# Patient Record
Sex: MALE | Race: WHITE | NOT HISPANIC OR LATINO | Employment: FULL TIME | URBAN - METROPOLITAN AREA
[De-identification: names, ages, dates, MRNs, and addresses within clinical notes are randomized per-mention and may not be internally consistent; named-entity substitution may affect disease eponyms.]

---

## 2017-07-21 ENCOUNTER — MEDICAL CORRESPONDENCE (OUTPATIENT)
Dept: HEALTH INFORMATION MANAGEMENT | Facility: CLINIC | Age: 42
End: 2017-07-21

## 2017-08-23 ENCOUNTER — OFFICE VISIT (OUTPATIENT)
Dept: OPTOMETRY | Facility: CLINIC | Age: 42
End: 2017-08-23

## 2017-08-23 DIAGNOSIS — H52.13 MYOPIA, BILATERAL: Primary | ICD-10-CM

## 2017-08-23 NOTE — MR AVS SNAPSHOT
After Visit Summary   8/23/2017    Rusty Serrato    MRN: [id number]           Patient Information     Date Of Birth          1975        Visit Information        Provider Department      8/23/2017 8:00 AM Clare Stephen, [facility]        Today's Diagnoses     Myopia, bilateral    -  1       Follow-ups after your visit        Your next 10 appointments already scheduled     Aug 30, 2017 12:00 PM CDT   (Arrive by 11:45 AM)   Return Multiple Sclerosis with Roel Chin MD   [facility] Multiple Sclerosis ([facility] [place])    [address] [place] [address] [phone]              Who to contact     Please call your clinic at [phone] to:    Ask questions about your health    Make or cancel appointments    Discuss your medicines    Learn about your test results    Speak to your doctor   If you have compliments or concerns about an experience at your clinic, or if you wish to file a complaint, please contact [facility] Patient Relations at [phone] or email us at [email or web address]         Additional Information About Your Visit        MyChart Information     [employer] gives you secure access to your electronic health record. If you see a primary care provider, you can also send messages to your care team and make appointments. If you have questions, please call your primary care clinic.  If you do not have a primary care provider, please call [phone] and they will assist you.      [employer]AYAK is an electronic gateway that provides easy, online access to your medical records. With KAY[employer], you can request a clinic appointment, read your test results, renew a prescription or communicate with your care team.     To access your existing account, please contact your [facility] or call [phone] for assistance.        Care EveryWhere ID     This is your Care  EveryWhere ID. This could be used by other organizations to access your [place] medical records  [id number]         Blood Pressure from Last 3 Encounters:   05/07/15 120/77   10/16/14 116/80   08/07/14 122/76    Weight from Last 3 Encounters:   05/07/15 84.8 kg (187 lb)   10/16/14 83.6 kg (184 lb 4.8 oz)   08/07/14 85.1 kg (187 lb 9.6 oz)              Today, you had the following     No orders found for display       Primary Care Provider Office Phone # Fax #    Simone Bustillo [phone]       [facility] [address] [facility] [address]        Equal Access to Services     MARIANA HAJI : Hadii medardo vasquezo Soliya, waaxda luqadaha, qaybta kaalmada adeegyada, waxjeannie fay. So [facility] [phone].    ATENCIÓN: Si habla español, tiene a diallo disposición servicios gratuitos de asistencia lingüística. Kristophermiladis al [phone].    We comply with applicable federal civil rights laws and [place] laws. We do not discriminate on the basis of race, color, national origin, age, disability sex, sexual orientation or gender identity.            Thank you!     Thank you for choosing EYE CLINIC  for your care. Our goal is always to provide you with excellent care. Hearing back from our patients is one way we can continue to improve our services. Please take a few minutes to complete the written survey that you may receive in the mail after your visit with us. Thank you!             Your Updated Medication List - Protect others around you: Learn how to safely use, store and throw away your medicines at www.disposemymeds.org.          This list is accurate as of: 8/23/17 11:59 PM.  Always use your most recent med list.                   Brand Name Dispense Instructions for use Diagnosis    AMINO ACID PO      Take by mouth daily        calcium citrate 250 MG Tabs      Take 2 tablets by mouth daily        cholecalciferol 5000 UNITS Tabs tablet    vitamin D3      Take by mouth daily        magnesium 250 MG tablet      Take 2 tablets by mouth daily        MULTIVITAMIN PO      Take by mouth daily        OMEGA-3 FISH OIL PO      Take by mouth daily

## 2017-08-24 NOTE — PROGRESS NOTES
No office visit. CL order only.    Contact Lens Billing  V-Code:  - Soft spherical  Final Contact Lens Rx      Brand Base Curve Diameter Sphere   Right Acuvue 1 Day Moist 8.5 14.2 -4.50   Left Acuvue 1 Day Moist 8.5 14.2 -4.50            # of units: 2 90-packs  Price per Unit: $68 per 90-pack    These are for cosmetic contact lenses.    Encounter Diagnosis   Name Primary?     Myopia, bilateral Yes        Date of last eye exam: 9/21/16

## 2017-08-29 DIAGNOSIS — G35 MS (MULTIPLE SCLEROSIS) (H): Primary | ICD-10-CM

## 2017-08-29 NOTE — PROGRESS NOTES
"This note is created from a chart review. He was seen by Dr. Page from 7/10/14 til 5/14/2015     The patient first  presented to neurological care because of a Bell's Palsy at age 20. Around the same time he also developed some pain in the fingertips of his left hand and the perioral region on the left. He had an MRI of the brain which he was told that it showed a lesion. He was told it could be MS and he should follow up in 5 years. He overall did well until 2010. At that time, he noticed changes in his speech and gait, that caused him to be fired from a job because they thought he was coming in \"hungover or drunk.\" The reportedly had gotten slowly worse over the ensuing 4 years. He got an MRI of the brain performed which revealed multiple areas of increased signal in T2 and FLAIR-weighted sequences within the subcortical white matter of bilateral hemispheres without posterior fossa involvement.  There were no enhancing lesions, a lot of lesions at the gray white junction and some volume loss was noted as well.  Due to the results of this MRI, he was thought to have MS.  He received some steroids which did help some with his energy.      From 2010 to 2014, hte patient had seen several doctors. Dr. Lei noted that the patient had some optic disc pallor. He also was seen by Dr. Botello, who   who diagnosed the patient withs relapsing remitting secondary progressive multiple sclerosis. The patient was started on Copaxone in May of 2014  "

## 2018-01-03 ENCOUNTER — TELEPHONE (OUTPATIENT)
Dept: UROLOGY | Facility: CLINIC | Age: 43
End: 2018-01-03

## 2018-01-03 NOTE — TELEPHONE ENCOUNTER
Received a message on nurse line in regards to a audit request. Patient has not been seen recently. The phone number that was given to me was a 1-800 number with a non-existing extension. Will wait for return phone call. Lorena Javier LPN

## 2018-02-21 DIAGNOSIS — H53.10 SUBJECTIVE VISUAL DISTURBANCE: Primary | ICD-10-CM

## 2018-02-22 ENCOUNTER — OFFICE VISIT (OUTPATIENT)
Dept: OPHTHALMOLOGY | Facility: CLINIC | Age: 43
End: 2018-02-22
Attending: OPHTHALMOLOGY
Payer: MEDICARE

## 2018-02-22 DIAGNOSIS — H53.10 SUBJECTIVE VISUAL DISTURBANCE: ICD-10-CM

## 2018-02-22 DIAGNOSIS — H47.20 OPTIC ATROPHY: Primary | ICD-10-CM

## 2018-02-22 PROCEDURE — G0463 HOSPITAL OUTPT CLINIC VISIT: HCPCS | Mod: 25,ZF | Performed by: TECHNICIAN/TECHNOLOGIST

## 2018-02-22 PROCEDURE — 92015 DETERMINE REFRACTIVE STATE: CPT | Mod: ZF | Performed by: TECHNICIAN/TECHNOLOGIST

## 2018-02-22 PROCEDURE — 92083 EXTENDED VISUAL FIELD XM: CPT | Mod: ZF | Performed by: OPHTHALMOLOGY

## 2018-02-22 PROCEDURE — 92133 CPTRZD OPH DX IMG PST SGM ON: CPT | Mod: ZF | Performed by: OPHTHALMOLOGY

## 2018-02-22 ASSESSMENT — CONF VISUAL FIELD
OD_NORMAL: 1
OS_NORMAL: 1
METHOD: COUNTING FINGERS

## 2018-02-22 ASSESSMENT — REFRACTION_MANIFEST
OD_SPHERE: -6.00
OD_CYLINDER: +0.75
OS_CYLINDER: +0.25
OD_AXIS: 035
OS_AXIS: 110
OS_SPHERE: -5.25

## 2018-02-22 ASSESSMENT — VISUAL ACUITY
OS_PH_CC: 20/20
OD_CC: 20/25
METHOD: SNELLEN - LINEAR
OD_CC+: -2
CORRECTION_TYPE: GLASSES
OS_CC+: -2
OS_CC: 20/20

## 2018-02-22 ASSESSMENT — REFRACTION_WEARINGRX
OS_AXIS: 100
OD_AXIS: 070
OD_SPHERE: -5.25
SPECS_TYPE: SVL
OS_CYLINDER: +0.50
OS_SPHERE: -4.75
OD_CYLINDER: +0.75

## 2018-02-22 ASSESSMENT — TONOMETRY
IOP_METHOD: ICARE
OS_IOP_MMHG: 11
OD_IOP_MMHG: 11

## 2018-02-22 NOTE — PROGRESS NOTES
Assessment & Plan     Rusty Serrato is a 42 year old male with the following diagnoses:   1. Optic atrophy    2. Subjective visual disturbance       Follow-up optic atrophy.  He has a history of primary progressive multiple sclerosis.  His last visit was in August 2016.  He denies any new changes with his vision.  On exam his visual acuity is 2025 right eye 20/20 left eye.  His visual fields show some subtle changes in the left eye, which is worse than his last visit a year ago.  However his nerve fiber layer is stable arguing that the visual field defect is artifact.    Overall I believe that his visual function is stable.  He does not have evidence of nystagmus or an JAIDEN.  I would recommend follow-up in a year or 2 for repeat evaluation.         Attending Physician Attestation:  Complete documentation of historical and exam elements from today's encounter can be found in the full encounter summary report (not reduplicated in this progress note).  I personally obtained the chief complaint(s) and history of present illness.  I confirmed and edited as necessary the review of systems, past medical/surgical history, family history, social history, and examination findings as documented by others; and I examined the patient myself.  I personally reviewed the relevant tests, images, and reports as documented above.  I formulated and edited as necessary the assessment and plan and discussed the findings and management plan with the patient and family. - Bear Zurita MD

## 2018-02-22 NOTE — LETTER
2018         RE:  :  MRN: Rusty QUEEN May  1975  [id number]     Dear Dr. Bustillo:    Your patient, Rusty QUEEN May, returned for neuro-ophthalmic follow up. My assessment and plan are below.  For further details, please see my attached clinic note.      Assessment & Plan     Rusty Serrato is a 42 year old male with the following diagnoses:   1. Optic atrophy    2. Subjective visual disturbance       Follow-up optic atrophy.  He has a history of primary progressive multiple sclerosis.  His last visit was in 2016.  He denies any new changes with his vision.  On exam his visual acuity is 5 right eye 20/20 left eye.  His visual fields show some subtle changes in the left eye, which is worse than his last visit a year ago.  However his nerve fiber layer is stable arguing that the visual field defect is artifact.    Overall I believe that his visual function is stable.  He does not have evidence of nystagmus or an JAIDEN.  I would recommend follow-up in a year or 2 for repeat evaluation.    Again, thank you for allowing me to participate in the care of your patient.      Sincerely,    Bear Zurita MD  Professor, Neuro-Ophthalmology  Department of Ophthalmology and Visual Neurosciences  [facility]    CC: Mo Chávez MD  [facility]

## 2018-02-22 NOTE — NURSING NOTE
Chief Complaints and History of Present Illnesses   Patient presents with     Follow Up For     optic atrophy     HPI    Symptoms:              Comments:  Rusty Serrato is a 42 year old male, following up for:    1. Optic atrophy   2. Subjective visual disturbance   3. Multiple sclerosis (H)     No new changes in vision.   No new changes health.   ALPHONSE Echavarria 2/22/2018 10:08 AM

## 2018-02-22 NOTE — MR AVS SNAPSHOT
After Visit Summary   2/22/2018    Rusty Serrato    MRN: [id number]           Patient Information     Date Of Birth          1975        Visit Information        Provider Department      2/22/2018 10:00 AM Bear [facility]ita [facility]        Today's Diagnoses     Optic atrophy    -  1    Subjective visual disturbance           Follow-ups after your visit        Future tests that were ordered for you today     Open Future Orders        Priority Expected Expires Ordered    DILATED FUNDUS EXAM Routine  8/25/2019 2/21/2018            Who to contact     Please call your clinic at [phone] to:    Ask questions about your health    Make or cancel appointments    Discuss your medicines    Learn about your test results    Speak to your doctor            Additional Information About Your Visit        [employer] Information     [employer] gives you secure access to your electronic health record. If you see a primary care provider, you can also send messages to your care team and make appointments. If you have questions, please call your primary care clinic.  If you do not have a primary care provider, please call [phone] and they will assist you.      [employer] is an electronic gateway that provides easy, online access to your medical records. With [employer], you can request a clinic appointment, read your test results, renew a prescription or communicate with your care team.     To access your existing account, please contact your [facility] or call [phone] for assistance.        Care EveryWhere ID     This is your Care EveryWhere ID. This could be used by other organizations to access your [place] medical records  [id number]         Blood Pressure from Last 3 Encounters:   05/07/15 120/77   10/16/14 116/80   08/07/14 122/76    Weight from Last 3 Encounters:   05/07/15 84.8 kg (187 lb)   10/16/14 83.6 kg (184 lb 4.8 oz)   08/07/14 85.1 kg (187 lb 9.6 oz)               We Performed the Following     Glaucoma Top OU     IOP Measurement     OCT Optic Nerve RNFL Spectralis OU (both eyes)        Primary Care Provider Office Phone # Fax #    Simone Bustillo [phone]       [facility] [address] [facility] [address]        Equal Access to Services     MARIANA HAJI : Hadii aad ku hadasho Soomaali, waaxda luqadaha, qaybta kaalmada adeegyada, waxay idiin hayaan adekaitlin khamoschuckie ladom fay. So [facility] [phone].    ATENCIÓN: Si habla español, tiene a diallo disposición servicios gratuitos de asistencia lingüística. Kristophermiladis al [phone].    We comply with applicable federal civil rights laws and [place] laws. We do not discriminate on the basis of race, color, national origin, age, disability, sex, sexual orientation, or gender identity.            Thank you!     Thank you for choosing [facility] CLINIC  for your care. Our goal is always to provide you with excellent care. Hearing back from our patients is one way we can continue to improve our services. Please take a few minutes to complete the written survey that you may receive in the mail after your visit with us. Thank you!             Your Updated Medication List - Protect others around you: Learn how to safely use, store and throw away your medicines at www.disposemymeds.org.          This list is accurate as of 2/22/18 11:34 AM.  Always use your most recent med list.                   Brand Name Dispense Instructions for use Diagnosis    AMINO ACID PO      Take by mouth daily        calcium citrate 250 MG Tabs      Take 2 tablets by mouth daily        cholecalciferol 5000 UNITS Tabs tablet    vitamin D3     Take by mouth daily        magnesium 250 MG tablet      Take 2 tablets by mouth daily        MULTIVITAMIN PO      Take by mouth daily        OMEGA-3 FISH OIL PO      Take by mouth daily

## 2018-04-13 NOTE — TELEPHONE ENCOUNTER
FUTURE VISIT INFORMATION      FUTURE VISIT INFORMATION:    Date: 4/17/18    Time: 10:00    Location: [facility]  REFERRAL INFORMATION:    Referring provider:  TA MALHOTRA     Referring providers clinic:  [facility]    Reason for visit/diagnosis  MS    RECORDS REQUESTED FROM:       Clinic name Comments Records Status Imaging Status   [facility] Records received                                      RECORDS STATUS      RECORDS RECEIVED FROM:    DATE RECEIVED:    NOTES (FOR ALL VISITS) DETAILS   OFFICE NOTE from referring provider    OFFICE NOTE from other specialist    DISCHARGE SUMMARY from hospital    DISCHARGE REPORT from the ER    OPERATIVE REPORT    MEDICATION LIST    IMAGING  (FOR ALL VISITS)    EMG    EEG    ECT    MRI (HEAD, NECK, SPINE)    CT (HEAD, NECK, SPINE)    OTHER

## 2018-04-17 ENCOUNTER — OFFICE VISIT (OUTPATIENT)
Dept: NEUROLOGY | Facility: CLINIC | Age: 43
End: 2018-04-17
Attending: PSYCHIATRY & NEUROLOGY
Payer: MEDICARE

## 2018-04-17 ENCOUNTER — TELEPHONE (OUTPATIENT)
Dept: NEUROLOGY | Facility: CLINIC | Age: 43
End: 2018-04-17

## 2018-04-17 ENCOUNTER — PRE VISIT (OUTPATIENT)
Dept: NEUROLOGY | Facility: CLINIC | Age: 43
End: 2018-04-17

## 2018-04-17 ENCOUNTER — MEDICAL CORRESPONDENCE (OUTPATIENT)
Dept: HEALTH INFORMATION MANAGEMENT | Facility: CLINIC | Age: 43
End: 2018-04-17

## 2018-04-17 VITALS
HEART RATE: 101 BPM | HEIGHT: 75 IN | SYSTOLIC BLOOD PRESSURE: 131 MMHG | DIASTOLIC BLOOD PRESSURE: 87 MMHG | OXYGEN SATURATION: 98 %

## 2018-04-17 DIAGNOSIS — G35 MULTIPLE SCLEROSIS (H): Primary | ICD-10-CM

## 2018-04-17 DIAGNOSIS — Z11.59 ENCOUNTER FOR SCREENING FOR OTHER VIRAL DISEASES (CODE): ICD-10-CM

## 2018-04-17 DIAGNOSIS — G35 MULTIPLE SCLEROSIS (H): ICD-10-CM

## 2018-04-17 LAB
ANION GAP SERPL CALCULATED.3IONS-SCNC: 7 MMOL/L (ref 3–14)
BUN SERPL-MCNC: 16 MG/DL (ref 7–30)
CALCIUM SERPL-MCNC: 9.1 MG/DL (ref 8.5–10.1)
CHLORIDE SERPL-SCNC: 105 MMOL/L (ref 94–109)
CO2 SERPL-SCNC: 27 MMOL/L (ref 20–32)
CREAT SERPL-MCNC: 0.98 MG/DL (ref 0.66–1.25)
GFR SERPL CREATININE-BSD FRML MDRD: 83 ML/MIN/1.7M2
GLUCOSE SERPL-MCNC: 88 MG/DL (ref 70–99)
IGA SERPL-MCNC: 81 MG/DL (ref 70–380)
IGG SERPL-MCNC: 986 MG/DL (ref 695–1620)
IGM SERPL-MCNC: 72 MG/DL (ref 60–265)
POTASSIUM SERPL-SCNC: 4 MMOL/L (ref 3.4–5.3)
SODIUM SERPL-SCNC: 138 MMOL/L (ref 133–144)

## 2018-04-17 PROCEDURE — 86707 HEPATITIS BE ANTIBODY: CPT | Performed by: PSYCHIATRY & NEUROLOGY

## 2018-04-17 PROCEDURE — 82784 ASSAY IGA/IGD/IGG/IGM EACH: CPT | Performed by: PSYCHIATRY & NEUROLOGY

## 2018-04-17 PROCEDURE — G0463 HOSPITAL OUTPT CLINIC VISIT: HCPCS | Mod: ZF

## 2018-04-17 PROCEDURE — 36415 COLL VENOUS BLD VENIPUNCTURE: CPT | Performed by: PSYCHIATRY & NEUROLOGY

## 2018-04-17 PROCEDURE — 87350 HEPATITIS BE AG IA: CPT | Performed by: PSYCHIATRY & NEUROLOGY

## 2018-04-17 PROCEDURE — 80048 BASIC METABOLIC PNL TOTAL CA: CPT | Performed by: PSYCHIATRY & NEUROLOGY

## 2018-04-17 RX ORDER — METHYLPREDNISOLONE SODIUM SUCCINATE 125 MG/2ML
125 INJECTION, POWDER, LYOPHILIZED, FOR SOLUTION INTRAMUSCULAR; INTRAVENOUS ONCE
Status: CANCELLED | OUTPATIENT
Start: 2018-04-17

## 2018-04-17 RX ORDER — ACETAMINOPHEN 325 MG/1
650 TABLET ORAL ONCE
Status: CANCELLED | OUTPATIENT
Start: 2018-04-17

## 2018-04-17 RX ORDER — DIPHENHYDRAMINE HCL 25 MG
50 CAPSULE ORAL ONCE
Status: CANCELLED | OUTPATIENT
Start: 2018-04-17 | End: 2018-04-17

## 2018-04-17 RX ORDER — METHENAMINE HIPPURATE 1000 MG/1
1 TABLET ORAL 2 TIMES DAILY
COMMUNITY
Start: 2018-03-26

## 2018-04-17 ASSESSMENT — PAIN SCALES - GENERAL: PAINLEVEL: NO PAIN (0)

## 2018-04-17 NOTE — MR AVS SNAPSHOT
After Visit Summary   4/17/2018    Rusty Serrato    MRN: [id number]           Patient Information     Date Of Birth          1975        Visit Information        Provider Department      4/17/2018 10:00 AM Dylan Barry MD [facility] Multiple Sclerosis        Today's Diagnoses     Multiple sclerosis (H)    -  1       Follow-ups after your visit        Follow-up notes from your care team     Return in about 3 months (around 7/17/2018), or with Jyotsna.      Your next 10 appointments already scheduled     Apr 17, 2018 12:15 PM CDT   LAB with  LAB   [facility] ([facility])    [address]  [facility] [id number] [phone]           Please do not eat 10-12 hours before your appointment if you are coming in fasting for labs on lipids, cholesterol, or glucose (sugar). This does not apply to pregnant women. Water, hot tea and black coffee (with nothing added) are okay. Do not drink other fluids, diet soda or chew gum.            Jul 19, 2018 10:00 AM CDT   (Arrive by 9:45 AM)   Return Multiple Sclerosis with YUVAL Gordillo CNP   [facility] Multiple Sclerosis ([facility])    [address] [address] [phone]              Future tests that were ordered for you today     Open Future Orders        Priority Expected Expires Ordered    Basic metabolic panel Routine  4/17/2019 4/17/2018    IgG Routine  4/17/2019 4/17/2018    IgM Routine  4/17/2019 4/17/2018    IgA Routine  4/17/2019 4/17/2018            Who to contact     If you have questions or need follow up information about today's clinic visit or your schedule please contact [facility] MULTIPLE SCLEROSIS directly at [phone].  Normal or non-critical lab and imaging results will be communicated to you by MyChart, letter or phone within 4 business days after the clinic has received the results. If you do not hear from us  "within 7 days, please contact the clinic through [employer] or phone. If you have a critical or abnormal lab result, we will notify you by phone as soon as possible.  Submit refill requests through [employer] or call your pharmacy and they will forward the refill request to us. Please allow 3 business days for your refill to be completed.          Additional Information About Your Visit        [employer] Information     [employer] lets you send messages to your doctor, view your test results, renew your prescriptions, schedule appointments and more. To sign up, go to [email or web address] . Click on \"Log in\" on the left side of the screen, which will take you to the Welcome page. Then click on \"Sign up Now\" on the right side of the page.     You will be asked to enter the access code listed below, as well as some personal information. Please follow the directions to create your username and password.     Your access code is: [email or web address]  Expires: 2018 11:34 AM     Your access code will  in 90 days. If you need help or a new code, please call your [place] clinic or [phone].        Care EveryWhere ID     This is your Care EveryWhere ID. This could be used by other organizations to access your [place] medical records  [id number]        Your Vitals Were     Pulse Height Pulse Oximetry             101 1.905 m (6' 3\") 98%          Blood Pressure from Last 3 Encounters:   18 131/87   05/07/15 120/77   10/16/14 116/80    Weight from Last 3 Encounters:   05/07/15 84.8 kg (187 lb)   10/16/14 83.6 kg (184 lb 4.8 oz)   14 85.1 kg (187 lb 9.6 oz)              We Performed the Following     Hepatitis Be antibody     Hepatitis Be antigen        Primary Care Provider Office Phone # Fax #    Simone Bustillo [phone]       [facility] [address] [facility] [address]        Equal Access to Services     MARIANA HAJI AH: Hadii salazar Tellez " renetta frenchmajose landontony mcgarry grantrick fay. So [facility] [phone].    ATENCIÓN: Si spencer alvarado, tiene a diallo disposición servicios gratuitos de asistencia lingüística. Llame al [phone].    We comply with applicable federal civil rights laws and [place] laws. We do not discriminate on the basis of race, color, national origin, age, disability, sex, sexual orientation, or gender identity.            Thank you!     Thank you for choosing [facility] MULTIPLE SCLEROSIS  for your care. Our goal is always to provide you with excellent care. Hearing back from our patients is one way we can continue to improve our services. Please take a few minutes to complete the written survey that you may receive in the mail after your visit with us. Thank you!             Your Updated Medication List - Protect others around you: Learn how to safely use, store and throw away your medicines at www.disposemymeds.org.          This list is accurate as of 4/17/18 11:34 AM.  Always use your most recent med list.                   Brand Name Dispense Instructions for use Diagnosis    AMINO ACID PO      Take by mouth daily        calcium citrate 250 MG Tabs      Take 2 tablets by mouth daily        cholecalciferol 5000 units Tabs tablet    vitamin D3     Take by mouth daily        magnesium 250 MG tablet      Take 2 tablets by mouth daily        methenamine hippurate 1 g Tabs tablet    HIPREX     Take 1 g by mouth 2 times daily    Multiple sclerosis (H)       MULTIVITAMIN PO      Take by mouth daily        OMEGA-3 FISH OIL PO      Take by mouth daily

## 2018-04-17 NOTE — NURSING NOTE
"Chief Complaint   Patient presents with     Consult     [facility]- Consult MS       Initial /87 (BP Location: Right arm, Patient Position: Chair, Cuff Size: Adult Regular)  Pulse 101  Ht 1.905 m (6' 3\")  SpO2 98% Estimated body mass index is 23.37 kg/(m^2) as calculated from the following:    Height as of 5/7/15: 1.905 m (6' 3\").    Weight as of 5/7/15: 84.8 kg (187 lb).  Medication Reconciliation: complete   Kathi Hernández MA      "

## 2018-04-17 NOTE — TELEPHONE ENCOUNTER
Prior Authorization Specialty Medication Request    Medication/Dose: Ampyra 10mg  ICD code (if different than what is on RX):  Multiple sclerosis and Neurologic Gait Dysfunction, G35  Previously Tried and Failed:  n/a    Important Lab Values: n/a  Rationale: Ampyra is the only FDA approved medication to help with gait safety and increase walking speed, please approve.    Insurance Name: BCBS Platinum Blue, Medicare Supplement  Insurance ID: [id number]  Insurance Phone Number: [phone]    Pharmacy Information (if different than what is on RX)  Name:  n/a  Phone:  n/a

## 2018-04-17 NOTE — TELEPHONE ENCOUNTER
Prior Authorization Infusion/Clinic Administered Request    Location: [facility]  Diagnosis and ICD: Primary Progressive multiple sclerosis, G35  Drug/Therapy: Ocrevus 300mg day 1 and day 15, then 600mg every 6 months    Previously Tried and Failed Therapies: Copaxone     Date of provider note with supporting information: 4/17/18    Urgency (When is the patient scheduled?): Low urgency    Would you like to include any research articles? n/a        If yes please call [phone] for further instructions about sending that information

## 2018-04-17 NOTE — Clinical Note
4/17/2018       RE: Rusty Serrato  [address]  [facility] [address]     Dear Colleague,    Thank you for referring your patient, Rusty Serrato, to the [facility] MULTIP[facility] at [facility]. Please see a copy of my visit note below.    No notes on file    Again, thank you for allowing me to participate in the care of your patient.      Sincerely,    Dylan Barry MD

## 2018-04-17 NOTE — LETTER
"4/17/2018      RE: Rusty QUEEN May  [address]  [facility] [address]       Service Date: 04/17/2018      REASON FOR VISIT:  Rusty Serrato is a 43-year-old man who I am meeting for the first time today for multiple sclerosis.  He previously followed in our clinic with Dr. Page who last saw him in 05/2015.  More recently he has been following with Dr. Chávez at [facility].      HISTORY OF PRESENT ILLNESS:  Mr. Serrato carries a diagnosis of primary progressive multiple sclerosis.  This was diagnosed around 2014 when he had progressively worsening slurred speech and unsteady gait.  He had workup including MRIs which showed lesions typical for MS.  He was followed for a while by Dr. Botello.  He was treated with Copaxone, but that was eventually stopped by Dr. Page as he became more convinced that this was the primary progressive form of the disorder.  Rusty did have an episode of Bell's palsy at age 20 which included the entire face.  He apparently did have MRI imaging at the time which showed at least 1 lesion concerning for MS, but the consensus has been that this was indeed a peripheral facial nerve palsy and not a MS relapse.      Rusty and his family tell me that his MS had been fairly stable up until last summer since which time there has been extensive worsening, mainly with weakness and clumsiness of the legs.  He has been falling very often.  The legs are weak and \"don't listen\" to him.  The arms are clumsy and his writing has deteriorated and he often will spill beverages, etc.  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  He tells me he had an active lesion on MRI while at [employer].      PHYSICAL EXAMINATION:   Blood pressure 131/87.  Pulse 101.  Respiratory rate 14.  Physical exam was limited today because almost all of the visit was spent in counseling and coordination of care.  Cranial nerves were notable mainly for some mild cerebellar dysarthria.  Strength " was normal in the arms, but hip flexion was mildly weak bilaterally.  Finger and toe tapping were clumsy and there was dysmetria on finger-nose-finger.  His gait was remarkably unsteady.  Having him stand up even while using his walker required several of us in the room to grab him to keep him from falling over.  I was able to test a timed 25-foot walk test with bilateral assistance which was done in 11.5 seconds.      I reviewed medical records including Dr. Page's office notes and his most recent MRIs from 2014  and 2015.  Brain MRI from 2015 shows atrophy of the corpus callosum and a moderate burden of T2 hyperintense lesions including radially oriented periventricular lesions, juxtacortical lesions and hyperintensity throughout the brainstem and posterior kem.  Cervical spine MRI from 2014 shows extensive patchy T2 hyperintensity throughout the cervical spinal cord.      IMPRESSION:  Primary progressive multiple sclerosis, clearly worsening over the past year, on no immunotherapy.      I spent 65 minutes with Rusty and his family today, greater than 50% of which was spent in counseling and coordination of care.  Almost the entire visit was spent discussing treatment of primary progressive MS.  He tells me he briefly discussed ocrelizumab with Dr. Chávez, but had never seriously considered it.  I went over this issue in detail.  I went over the Oratorio trial results which showed a modest decrease in disease progression among patients with primary progressive MS.  I went over the mechanism of action of the drug.  While the benefit was modest, it is clearly the best available treatment and I think it is worthwhile in patients who are #1, definitely still progressing and #2, have a neurologic function under threat that we are hoping to maintain, usually walking.  This fits the bill for Rusty.  He also had apparently enhancing lesions, and is also young with a short disease duration and male, all of those  factors pointed to more likely favorable response for primary progressive MS with B-cell depletion.  Thus, I certainly think it is a reasonable thing to do and I recommended it to them.  The other issue we discussed was symptomatic treatment with dalfampridine.  He has never had a seizure and I see no reason not to attempt this.  I went over the mechanism of action of that as well, the slightly increased risk of seizures and the fact that only about 30-40% of patients show benefit.      PLAN:   1.  Trial of ocrelizumab.  We will get hepatitis B screening and baseline immunoglobulin levels today.   2.  Trial of dalfampridine.  Will check renal function with a BMP today.   3.  I would like him to return in about 3 months with Jyotsna Montez CNP for a more thorough examination and to evaluate any benefit of the dalfampridine.         D: 2018   T: 2018   MT: nh      Name:     ISAURA WHEELER   MRN:      [id number]        Account:      [id number]   :      1975           Service Date: 2018      Document: [id number]        Dylan Barry MD

## 2018-04-17 NOTE — TELEPHONE ENCOUNTER
Patient was in for an office visit today and the decision was made for him to start on Ocrevus; Start form signed by the patient, awaiting signature from Dr. Barry; Baseline labs are currently pending; Ocrevus therapy plan orders placed and routed to Dr. Barry for review and signature; Will alert PA team once orders signed and will fax start form in once signed.    Dinora Larry MS RN Care Coordinator

## 2018-04-17 NOTE — TELEPHONE ENCOUNTER
Ocrevus therapy plan order signed by Dr. Barry; I will send over the PA request to our team.    Dinora Larry, MS RN Care Coordinator

## 2018-04-17 NOTE — TELEPHONE ENCOUNTER
[employer] information:    Case ID: [id number]  Patient ID: [id number]    Dinora Larry MS RN Care Coordinator

## 2018-04-18 LAB — HBV E AB SERPL QL IA: NEGATIVE

## 2018-04-19 LAB — HBV E AG SERPL QL IA: NEGATIVE

## 2018-04-19 NOTE — TELEPHONE ENCOUNTER
Called patient and gave him the number to [facility] to call and schedule his appointments. He is requesting a call from someone on the financial team to discuss cost.

## 2018-04-20 NOTE — TELEPHONE ENCOUNTER
PA Initiation    Medication: Ampyra 10mg - PA inititated  Insurance Company: [employer] - Phone [phone] Fax [phone]  Pharmacy Filling the Rx:    Filling Pharmacy Phone:    Filling Pharmacy Fax:    Start Date: 4/20/2018

## 2018-04-20 NOTE — TELEPHONE ENCOUNTER
Prior Authorization Approval    Authorization Effective Date: 1/20/2018  Authorization Expiration Date: 7/20/2018  Medication: Ampyra 10mg - PA Approved  Approved Dose/Quantity: 10mg   Reference #: [id number]   Insurance Company: [employer] - Phone [phone] Fax [phone]  Expected CoPay:       CoPay Card Available:      Foundation Assistance Needed:    Which Pharmacy is filling the prescription (Not needed for infusion/clinic administered):    Pharmacy Notified: Yes  Patient Notified: Yes

## 2018-04-20 NOTE — PROGRESS NOTES
"Service Date: 04/17/2018      REASON FOR VISIT:  Rusty Serrato is a 43-year-old man who I am meeting for the first time today for multiple sclerosis.  He previously followed in our clinic with Dr. Page who last saw him in 05/2015.  More recently he has been following with Dr. Chávez at [facility].      HISTORY OF PRESENT ILLNESS:  Mr. Serrato carries a diagnosis of primary progressive multiple sclerosis.  This was diagnosed around 2014 when he had progressively worsening slurred speech and unsteady gait.  He had workup including MRIs which showed lesions typical for MS.  He was followed for a while by Dr. Botello.  He was treated with Copaxone, but that was eventually stopped by Dr. Page as he became more convinced that this was the primary progressive form of the disorder.  Rusty did have an episode of Bell's palsy at age 20 which included the entire face.  He apparently did have MRI imaging at the time which showed at least 1 lesion concerning for MS, but the consensus has been that this was indeed a peripheral facial nerve palsy and not a MS relapse.      Rusty and his family tell me that his MS had been fairly stable up until last summer since which time there has been extensive worsening, mainly with weakness and clumsiness of the legs.  He has been falling very often.  The legs are weak and \"don't listen\" to him.  The arms are clumsy and his writing has deteriorated and he often will spill beverages, etc.  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  He tells me he had an active lesion on MRI while at [place].      PHYSICAL EXAMINATION:   Blood pressure 131/87.  Pulse 101.  Respiratory rate 14.  Physical exam was limited today because almost all of the visit was spent in counseling and coordination of care.  Cranial nerves were notable mainly for some mild cerebellar dysarthria.  Strength was normal in the arms, but hip flexion was mildly weak bilaterally.  Finger and " toe tapping were clumsy and there was dysmetria on finger-nose-finger.  His gait was remarkably unsteady.  Having him stand up even while using his walker required several of us in the room to grab him to keep him from falling over.  I was able to test a timed 25-foot walk test with bilateral assistance which was done in 11.5 seconds.      I reviewed medical records including Dr. Page's office notes and his most recent MRIs from 2014  and 2015.  Brain MRI from 2015 shows atrophy of the corpus callosum and a moderate burden of T2 hyperintense lesions including radially oriented periventricular lesions, juxtacortical lesions and hyperintensity throughout the brainstem and posterior kem.  Cervical spine MRI from 2014 shows extensive patchy T2 hyperintensity throughout the cervical spinal cord.      IMPRESSION:  Primary progressive multiple sclerosis, clearly worsening over the past year, on no immunotherapy.      I spent 65 minutes with Rusty and his family today, greater than 50% of which was spent in counseling and coordination of care.  Almost the entire visit was spent discussing treatment of primary progressive MS.  He tells me he briefly discussed ocrelizumab with Dr. Chávez, but had never seriously considered it.  I went over this issue in detail.  I went over the Oratorio trial results which showed a modest decrease in disease progression among patients with primary progressive MS.  I went over the mechanism of action of the drug.  While the benefit was modest, it is clearly the best available treatment and I think it is worthwhile in patients who are #1, definitely still progressing and #2, have a neurologic function under threat that we are hoping to maintain, usually walking.  This fits the bill for Rusty.  He also had apparently enhancing lesions, and is also young with a short disease duration and male, all of those factors pointed to more likely favorable response for primary progressive MS with  B-cell depletion.  Thus, I certainly think it is a reasonable thing to do and I recommended it to them.  The other issue we discussed was symptomatic treatment with dalfampridine.  He has never had a seizure and I see no reason not to attempt this.  I went over the mechanism of action of that as well, the slightly increased risk of seizures and the fact that only about 30-40% of patients show benefit.      PLAN:   1.  Trial of ocrelizumab.  We will get hepatitis B screening and baseline immunoglobulin levels today.   2.  Trial of dalfampridine.  Will check renal function with a BMP today.   3.  I would like him to return in about 3 months with Jyotsna Montez CNP for a more thorough examination and to evaluate any benefit of the dalfampridine.         DEMETRIS JONES MD             D: 2018   T: 2018   MT: nh      Name:     ISAURA WHEELER   MRN:      [id number]        Account:      [id number]   :      1975           Service Date: 2018      Document: [id number]

## 2018-05-08 NOTE — TELEPHONE ENCOUNTER
Pt has a high copay (>$700). Unfortunately not assistance program available from the . All 3rd party assistance funds currently available but advised pt to start enrollment into programs in case funds become available throughout th year.

## 2018-05-14 NOTE — TELEPHONE ENCOUNTER
Patient's baseline hepatitis B screenings were completed incorrectly, which Dr. Barry is not sure how that happened; Attempted to place orders for HBSAg, HBSAb and HBCAb, however, I encountered an issue; Will discuss with Dr. Barry in more detail tomorrow.    Dinora Larry, MS RN Care Coordinator

## 2018-05-15 NOTE — TELEPHONE ENCOUNTER
Lab orders put in per Dr. Barry; Letter placed in the mail to the patient advising him to have these done.    Dinora Larry, MS RN Care Coordinator

## 2018-05-17 ENCOUNTER — TELEPHONE (OUTPATIENT)
Dept: NEUROLOGY | Facility: CLINIC | Age: 43
End: 2018-05-17

## 2018-05-17 NOTE — TELEPHONE ENCOUNTER
Returned Lizeth (mother) phone call and left a VMM asking for a call back to discuss the message below.

## 2018-05-17 NOTE — TELEPHONE ENCOUNTER
Spoke with pts mother about Ampyra. Explained how his copay structure works, gave contact information to ampyra pt support services, and advised about 3rd party funds available. She will contact me directly if she has any further questions concerning the financial benefits available.

## 2018-05-17 NOTE — TELEPHONE ENCOUNTER
[facility] Call Center    Phone Message    May a detailed message be left on voicemail: yes    Reason for Call: Other: Pts mother calling because at pts last appt, they were told that Dr. Barry wanted to start pt on trials of two medications (ocrelizumab and dalfampridine) they have not received any directive about when or if that will start. Please call back to discuss.    Action Taken: Message routed to:  [facility]

## 2018-05-17 NOTE — TELEPHONE ENCOUNTER
[facility]    Phone Message    May a detailed message be left on voicemail: no    Reason for Call: Other: Pt's mother Shelbi called to speak with Tonia, who tried calling her today and left a voicemail. Shelbi wants a call back at [phone] to reach her.     Action Taken: Message routed to:  [facility]

## 2018-06-13 ENCOUNTER — TRANSFERRED RECORDS (OUTPATIENT)
Dept: HEALTH INFORMATION MANAGEMENT | Facility: CLINIC | Age: 43
End: 2018-06-13

## 2018-06-18 NOTE — TELEPHONE ENCOUNTER
Patient is scheduled to have his labs done prior to his follow up appointment with Jyotsna Montez.    Dinora Larry, MS RN Care Coordinator

## 2018-07-16 ENCOUNTER — TELEPHONE (OUTPATIENT)
Dept: NEUROLOGY | Facility: CLINIC | Age: 43
End: 2018-07-16

## 2018-07-16 NOTE — TELEPHONE ENCOUNTER
Called in prescription for 4-AP as indicated below by Dr. Barry to [facility] (phone: [phone]). They will contact patient to set up.

## 2018-07-16 NOTE — TELEPHONE ENCOUNTER
"Called Lizeth, patient's mother to let her know that they are seeing Jytosna per Dr. Barry's request for a 3 month follow up and to assess how the Ampyra is working. Lizeth tells me that Rusty was unable to start the medication due to high copay ($700).   She said he is \"really having trouble walking.\" She is wondering about the generic for Ampyra. I explained that this is not on the market yet but that there is compounded 4-AP that could be possible. Dr. Barry, would you like to order this for the patient?   Lizeth says this can also be discussed further at his clinic visit on Thursday.   "

## 2018-07-16 NOTE — TELEPHONE ENCOUNTER
[facility] Call [facility]    Phone Message    May a detailed message be left on voicemail: yes    Reason for Call: Other: Per call from Lizeth were under the impression that PT is to FU with Dr Estrada again but unsure why PT is scheduled to see Jyotsna Montez on 7/19.  Lizeth is requesting a call back to discuss it at the above #     Action Taken: Message routed to:  [facility] Neurology

## 2018-07-16 NOTE — TELEPHONE ENCOUNTER
I'm fine with trying compounded 4-AP.  5 mg tabs, #=90, 3 refills.  Take 1 tab qday (morning) x3 days, then 1 tab bid x3 days, then 1 tab tid.  If tolerated, we can gradually increase further from there.

## 2018-07-19 ENCOUNTER — OFFICE VISIT (OUTPATIENT)
Dept: NEUROLOGY | Facility: CLINIC | Age: 43
End: 2018-07-19
Attending: NURSE PRACTITIONER
Payer: MEDICARE

## 2018-07-19 VITALS
WEIGHT: 192 LBS | HEIGHT: 75 IN | SYSTOLIC BLOOD PRESSURE: 117 MMHG | DIASTOLIC BLOOD PRESSURE: 78 MMHG | BODY MASS INDEX: 23.87 KG/M2 | HEART RATE: 60 BPM

## 2018-07-19 DIAGNOSIS — Z11.59 ENCOUNTER FOR SCREENING FOR OTHER VIRAL DISEASES (CODE): ICD-10-CM

## 2018-07-19 DIAGNOSIS — G35 MULTIPLE SCLEROSIS (H): Primary | ICD-10-CM

## 2018-07-19 LAB
HBV CORE AB SERPL QL IA: NONREACTIVE
HBV SURFACE AB SERPL IA-ACNC: 87.6 M[IU]/ML
HBV SURFACE AG SERPL QL IA: NONREACTIVE

## 2018-07-19 PROCEDURE — 87340 HEPATITIS B SURFACE AG IA: CPT | Performed by: PSYCHIATRY & NEUROLOGY

## 2018-07-19 PROCEDURE — 86706 HEP B SURFACE ANTIBODY: CPT | Performed by: PSYCHIATRY & NEUROLOGY

## 2018-07-19 PROCEDURE — 86704 HEP B CORE ANTIBODY TOTAL: CPT | Performed by: PSYCHIATRY & NEUROLOGY

## 2018-07-19 PROCEDURE — 36415 COLL VENOUS BLD VENIPUNCTURE: CPT | Performed by: PSYCHIATRY & NEUROLOGY

## 2018-07-19 PROCEDURE — G0463 HOSPITAL OUTPT CLINIC VISIT: HCPCS | Mod: ZF

## 2018-07-19 NOTE — PROGRESS NOTES
[facility] OUTPATIENT MULTIPLE SCLEROSIS CLINIC VISIT NOTE    REASON FOR VISIT:  Rusty is a 43-year-old left-handed male who returns to the clinic today for regularly scheduled followup of his diagnosis of primary progressive multiple sclerosis.  He is accompanied by his dad and mom.  He was most recently seen by Dr. Barry in our clinic on 04/17/2018.      HISTORY OF PRESENT ILLNESS:  Per previous clinic notes, Rusty was diagnosed with Multiple Sclerosis around 2014 when he had progressively worsening slurred speech and unsteady gait.  He had workup including MRIs which showed lesions typical for MS.  He was followed for a while by Dr. Botello.  He was treated with Copaxone, but that was eventually stopped by Dr. Page as he became more convinced that this was the primary progressive form of the disorder.  Rusty did have an episode of Bell's palsy at age 20 which included the entire face.  He apparently did have MRI imaging at the time which showed at least 1 lesion concerning for MS, but the consensus has been that this was indeed a peripheral facial nerve palsy and not a MS relapse.      INTERVAL HISTORY SINCE LAST VISIT:   Rusty was seen by Dr. Barry in our clinic on 04/17/2018 and the decision was made to start him on ocrelizumab.  Today, patient completed hepatitis B panel as a screening lab for Ocrevus initiation.  He was also recommended to start Ampyra to help him with ambulation.  His renal function and GFR was checked during his last visit and are within normal limits. He do not have a h/o seizures. He completed a 20-foot timed walk during that appointment at 11.5 seconds. Later on, patient found out that he has a high co-pay for dalfampridine, so wasn't able to start this medication yet. Compounded 4- AP was discussed with patient and family as a possibility and a prescription was sent to [facility] by Dr. Barry on 07/16/2018.  The patient's wife, who was on the phone by a  conference call, reported that she received a text message from [facility], but she has not returned their call yet.      Patient denies any new changes in vision, balance, strength or sensation suggestive of new relapse of multiple sclerosis since he was last seen here.  He denies any visual changes or facial weakness or asymmetry.  The patient has ongoing issues with vertigo, every time when he changes his position from sitting to standing. Both patient and his parents reported that his speech tend to get slurry when he is extremely fatigued. He has been on speech therapy once a week for dysarthria.  Denies any problems with swallowing.  He had a swallow evaluation done in 10/2017 and that is reportedly normal.  Report is available in Care Everywhere.  The patient denies any hearing issues.  He is left handed.  He has been noticing dexterity issues, mainly with writing.  Denies any numbness and tingling, weakness in his legs.     Patient reports that his balance is terrible and his falls frequency has been increased recently.  He is falling at least once a day.  The patient also reports increased sensitivity to body temperature. He uses a walker for long distance walking and inside his house he tends to use furniture and walls for support.  He had physical therapy for almost 6 months after hospital admission last year and feels that it was helpful at that time.  He reports that his mood is stable, rates his fatigue as moderate.  He gets 7-8 hours of sleep at night.  Occasional issues with spasticity.  Since 2014, he self catheterize, about 4 times a day.  He denies any recent UTI.  Occasional issues with constipation and he uses over-the-counter laxative medications.      PAST MEDICAL/SURGICAL HISTORY:   1.  Bell's palsy at the age of 20.        No surgeries in the past.      FAMILY HISTORY:  Negative for demyelinating disease.      SOCIAL HISTORY:  He is .  He has 3 children, ages 9, 8 and 4.  He  "stopped working in 2014 after his MS diagnosis.  Denies smoking and recreational drug use.  Reports moderate alcohol use.  At home, he tries to ride a stationary bike for exercise.      VITAMIN D:  He is currently taking 50,000 international units once a week.     5/7/2015 MRI Brain:  Impression:   1. The study demonstrates greater than 20 foci of T2-hyperintensity  within the cerebral white matter consistent with the clinical  suspicion of demyelinating disease. There are no foci of abnormal  enhancement noted intracranially.   2. There is a new T2 lesion within the right occipital white matter as  well as a new lesion within the left posterior aspect of the kem  compared to 10/16/2014. These lesions do not enhance.  3. Stable appearing right-sided retrocerebellar arachnoid cyst.    PHYSICAL EXAMINATION:   VITAL SIGNS:/78  Pulse 60  Ht 1.905 m (6' 3\")  Wt 87.1 kg (192 lb)  BMI 24 kg/m2   MENTAL STATUS: Alert,awake and oriented times four.   CRANIAL NERVES: Visual fields are full to confrontation. The pupils are equal, round and react to light and there is no Rajiv Greg pupil. Funduscopic examination demonstrates no optic pallor, papilledema or retinal hemorrhage/exudate. Extraocular movements are intact with no internuclear ophthalmoplegia. No nystagmus. Facial strength and sensation are normal. Hearing is normal. Palate elevation and tongue protrusion are normal.   POWER: Strength is normal (5/5) in the following muscles/groups: Deltoids, biceps, triceps, wrist extensors, right finger abductors, knee flexors, foot dorsiflexors. Left finger abductors and bilateral hip flexors are slightly weak at 4+.   SENSORY: Intact to light touch.   REFLEXES: Reflexes are normal, present and symmetric at biceps, triceps, brachioradialis, knees and ankles.   MOTOR/CEREBELLAR: There are no tremors, myoclonus or other abnormal movements. Tone is within normal limits in the limbs. Finger and toe tapping were clumsy and " there was dysmetria on finger-nose-finger. There is no pronator drift. Mild swaying on Romberg.   GAIT: Gait is very unsteady and he is not able to do tandem walking.      ASSESSMENT/PLAN:   1.  Primary progressive MS: Today's clinical exam fairly stable compared to his previous exam by Dr. Barry in April.     Patient already signed start form for Ocrevus. We will wait until his labs for hepatitis B panel is back and will update the patient. Then he can schedule his infusion.     2.  Gait instability.     The patient to start 4-AP prescription from a compounding pharmacy to help him with ambulation.  A 25-foot timed walk was completed during his last exam on 2018 and he was able to complete it within 11.5 seconds.  I also talked about physical therapy at [facility]. Both patient and family is interested, so a referral was made and the phone number is given to the patient to call and schedule this appointment.     3.  Increased heat sensitivity.     I recommended to use a cooling vest during summer months.     4.  Follow up with me in 3 months after starting Ocrevus.            YUVAL KRISHNAN, CNP             D: 2018   T: 2018   MT: [id number]      Name:     ISAURA WHEELER   MRN:      [id number]        Account:      [id number]   :      1975           Service Date: 2018      Document: [id number]

## 2018-07-19 NOTE — PATIENT INSTRUCTIONS
1. [facility].        [facility]        [address], [place] [address]        Phone: [phone]       2. Start 4 AP as prescribed. Contact [facility].     3. May use cooling vest for heat sensitivity.    4. We will contact you when Ocrevus infusion is ready to be scheduled.     5. F/u with me in 3 months after starting Ocrevus.

## 2018-07-19 NOTE — LETTER
7/19/2018       RE: Rusty Serrato  [address]  [facility] [address]     Dear Colleague,    Thank you for referring your patient, Rusty Serrato, to the [facility] MULTIPLE SCLEROSIS at [facility]. Please see a copy of my visit note below.    [facility] OUTPATIENT MULTIPLE SCLEROSIS CLINIC VISIT NOTE    REASON FOR VISIT:  Rusty is a 43-year-old left-handed male who returns to the clinic today for regularly scheduled followup of his diagnosis of primary progressive multiple sclerosis.  He is accompanied by his dad and mom.  He was most recently seen by Dr. Barry in our clinic on 04/17/2018.      HISTORY OF PRESENT ILLNESS:  Per previous clinic notes, Rusty was diagnosed with Multiple Sclerosis around 2014 when he had progressively worsening slurred speech and unsteady gait.  He had workup including MRIs which showed lesions typical for MS.  He was followed for a while by Dr. Botello.  He was treated with Copaxone, but that was eventually stopped by Dr. Page as he became more convinced that this was the primary progressive form of the disorder.  Rusty did have an episode of Bell's palsy at age 20 which included the entire face.  He apparently did have MRI imaging at the time which showed at least 1 lesion concerning for MS, but the consensus has been that this was indeed a peripheral facial nerve palsy and not a MS relapse.      INTERVAL HISTORY SINCE LAST VISIT:   Rusty was seen by Dr. Barry in our clinic on 04/17/2018 and the decision was made to start him on ocrelizumab.  Today, patient completed hepatitis B panel as a screening lab for Ocrevus initiation.  He was also recommended to start Ampyra to help him with ambulation.  His renal function and GFR was checked during his last visit and are within normal limits. He do not have a h/o seizures. He completed a 20-foot timed walk during that appointment at 11.5 seconds. Later on, patient found out that he  has a high co-pay for dalfampridine, so wasn't able to start this medication yet. Compounded 4- AP was discussed with patient and family as a possibility and a prescription was sent to [facility] by Dr. Barry on 07/16/2018.  The patient's wife, who was on the phone by a conference call, reported that she received a text message from [facility], but she has not returned their call yet.      Patient denies any new changes in vision, balance, strength or sensation suggestive of new relapse of multiple sclerosis since he was last seen here.  He denies any visual changes or facial weakness or asymmetry.  The patient has ongoing issues with vertigo, every time when he changes his position from sitting to standing. Both patient and his parents reported that his speech tend to get slurry when he is extremely fatigued. He has been on speech therapy once a week for dysarthria.  Denies any problems with swallowing.  He had a swallow evaluation done in 10/2017 and that is reportedly normal.  Report is available in Care Everywhere.  The patient denies any hearing issues.  He is left handed.  He has been noticing dexterity issues, mainly with writing.  Denies any numbness and tingling, weakness in his legs.     Patient reports that his balance is terrible and his falls frequency has been increased recently.  He is falling at least once a day.  The patient also reports increased sensitivity to body temperature. He uses a walker for long distance walking and inside his house he tends to use furniture and walls for support.  He had physical therapy for almost 6 months after hospital admission last year and feels that it was helpful at that time.  He reports that his mood is stable, rates his fatigue as moderate.  He gets 7-8 hours of sleep at night.  Occasional issues with spasticity.  Since 2014, he self catheterize, about 4 times a day.  He denies any recent UTI.  Occasional issues with constipation and he uses  "over-the-counter laxative medications.      PAST MEDICAL/SURGICAL HISTORY:   1.  Bell's palsy at the age of 20.        No surgeries in the past.      FAMILY HISTORY:  Negative for demyelinating disease.      SOCIAL HISTORY:  He is .  He has 3 children, ages 9, 8 and 4.  He stopped working in 2014 after his MS diagnosis.  Denies smoking and recreational drug use.  Reports moderate alcohol use.  At home, he tries to ride a stationary bike for exercise.      VITAMIN D:  He is currently taking 50,000 international units once a week.     5/7/2015 MRI Brain:  Impression:   1. The study demonstrates greater than 20 foci of T2-hyperintensity  within the cerebral white matter consistent with the clinical  suspicion of demyelinating disease. There are no foci of abnormal  enhancement noted intracranially.   2. There is a new T2 lesion within the right occipital white matter as  well as a new lesion within the left posterior aspect of the kem  compared to 10/16/2014. These lesions do not enhance.  3. Stable appearing right-sided retrocerebellar arachnoid cyst.    PHYSICAL EXAMINATION:   VITAL SIGNS:/78  Pulse 60  Ht 1.905 m (6' 3\")  Wt 87.1 kg (192 lb)  BMI 24 kg/m2   MENTAL STATUS: Alert,awake and oriented times four.   CRANIAL NERVES: Visual fields are full to confrontation. The pupils are equal, round and react to light and there is no Rajiv Greg pupil. Funduscopic examination demonstrates no optic pallor, papilledema or retinal hemorrhage/exudate. Extraocular movements are intact with no internuclear ophthalmoplegia. No nystagmus. Facial strength and sensation are normal. Hearing is normal. Palate elevation and tongue protrusion are normal.   POWER: Strength is normal (5/5) in the following muscles/groups: Deltoids, biceps, triceps, wrist extensors, right finger abductors, knee flexors, foot dorsiflexors. Left finger abductors and bilateral hip flexors are slightly weak at 4+.   SENSORY: Intact to light " touch.   REFLEXES: Reflexes are normal, present and symmetric at biceps, triceps, brachioradialis, knees and ankles.   MOTOR/CEREBELLAR: There are no tremors, myoclonus or other abnormal movements. Tone is within normal limits in the limbs. Finger and toe tapping were clumsy and there was dysmetria on finger-nose-finger. There is no pronator drift. Mild swaying on Romberg.   GAIT: Gait is very unsteady and he is not able to do tandem walking.      ASSESSMENT/PLAN:   1.  Primary progressive MS: Today's clinical exam fairly stable compared to his previous exam by Dr. Barry in April.     Patient already signed start form for Ocrevus. We will wait until his labs for hepatitis B panel is back and will update the patient. Then he can schedule his infusion.     2.  Gait instability.     The patient to start 4-AP prescription from a compounding pharmacy to help him with ambulation.  A 25-foot timed walk was completed during his last exam on 2018 and he was able to complete it within 11.5 seconds.  I also talked about physical therapy at [facility]. Both patient and family is interested, so a referral was made and the phone number is given to the patient to call and schedule this appointment.     3.  Increased heat sensitivity.     I recommended to use a cooling vest during summer months.     4.  Follow up with me in 3 months after starting Ocrevus.            YUVAL KRISHNAN, ELVA             D: 2018   T: 2018   MT: ROXI      Name:     ISAURA WHEELER   MRN:      [id number]        Account:      [id number]   :      1975           Service Date: 2018      Document: [id number]

## 2018-07-19 NOTE — MR AVS SNAPSHOT
After Visit Summary   7/19/2018    Rusty Serrato    MRN: [id number]           Patient Information     Date Of Birth          1975        Visit Information        Provider Department      7/19/2018 10:00 AM Jyotsna Montez APRN [facility] Multiple Sclerosis        Today's Diagnoses     Multiple sclerosis (H)    -  1      Care Instructions    1. Referral to [facility].        [facility]        [address], [place] [address]        Phone: [phone]       2. Start 4 AP as prescribed. Contact [facility].     3. May use cooling vest for heat sensitivity.    4. We will contact you when Ocrevus infusion is ready to be scheduled.     5. F/u with me in 3 months after starting Ocrevus.          Follow-ups after your visit        Additional Services     PHYSICAL THERAPY REFERRAL       *This therapy referral will be filtered to a centralized scheduling office at [facility] and the patient will receive a call to schedule an appointment at a [place] location most convenient for them. *     [facility] provides Physical Therapy evaluation and treatment and many specialty services across the [place] system.  If requesting a specialty program, please choose from the list below.    If you have not heard from the scheduling office within 2 business days, please call [phone] for all locations, with the exception of [place], please call [phone] and [facility], please call [phone]  Treatment: Evaluation & Treatment  Special Instructions/Modalities: Balance and gait  Special Programs: [facility]    Please be aware that coverage of these services is subject to the terms and limitations of your health insurance plan.  Call member services at your health plan with any benefit or coverage questions.      **Note to Provider:  If you are referring outside of [place] for the therapy  "appointment, please list the name of the location in the \"special instructions\" above, print the referral and give to the patient to schedule the appointment.                  Follow-up notes from your care team     Return in about 3 months (around 10/19/2018).      Who to contact     If you have questions or need follow up information about today's clinic visit or your schedule please contact [facility] MULTIPLE SCLEROSIS directly at [phone].  Normal or non-critical lab and imaging results will be communicated to you by R[employer]Lhart, letter or phone within 4 business days after the clinic has received the results. If you do not hear from us within 7 days, please contact the clinic through [facility] or phone. If you have a critical or abnormal lab result, we will notify you by phone as soon as possible.  Submit refill requests through [employer] or call your pharmacy and they will forward the refill request to us. Please allow 3 business days for your refill to be completed.          Additional Information About Your Visit        [employer] Information     [employer] lets you send messages to your doctor, view your test results, renew your prescriptions, schedule appointments and more. To sign up, go to [email or web address] . Click on \"Log in\" on the left side of the screen, which will take you to the Welcome page. Then click on \"Sign up Now\" on the right side of the page.     You will be asked to enter the access code listed below, as well as some personal information. Please follow the directions to create your username and password.     Your access code is: [email or web address]  Expires: 10/15/2018  6:30 AM     Your access code will  in 90 days. If you need help or a new code, please call your [facility] clinic or [phone].        Care EveryWhere ID     This is your Care EveryWhere ID. This could be used by other organizations to access your [facility] medical records  [id number]        Your Vitals Were     Pulse Height " "BMI (Body Mass Index)             60 1.905 m (6' 3\") 24 kg/m2          Blood Pressure from Last 3 Encounters:   07/19/18 117/78   04/17/18 131/87   05/07/15 120/77    Weight from Last 3 Encounters:   07/19/18 87.1 kg (192 lb)   05/07/15 84.8 kg (187 lb)   10/16/14 83.6 kg (184 lb 4.8 oz)              We Performed the Following     PHYSICAL THERAPY REFERRAL        Primary Care Provider Office Phone # Fax #    Simone Bustillo [phone]       [facility] [address] [address]        Equal Access to Services     MARIANA HAJI : Malissa vasquezo Soliya, waaxda gillian, qaybta kaalmada adekaitlinyada, tony pan . So [facility] [phone].    ATENCIÓN: Si habla español, tiene a diallo disposición servicios gratuitos de asistencia lingüística. Llame al [phone].    We comply with applicable federal civil rights laws and [place] laws. We do not discriminate on the basis of race, color, national origin, age, disability, sex, sexual orientation, or gender identity.            Thank you!     Thank you for choosing [facility] MULTIPLE SCLEROSIS  for your care. Our goal is always to provide you with excellent care. Hearing back from our patients is one way we can continue to improve our services. Please take a few minutes to complete the written survey that you may receive in the mail after your visit with us. Thank you!             Your Updated Medication List - Protect others around you: Learn how to safely use, store and throw away your medicines at www.disposemymeds.org.          This list is accurate as of 7/19/18 10:52 AM.  Always use your most recent med list.                   Brand Name Dispense Instructions for use Diagnosis    AMINO ACID PO      Take by mouth daily        calcium citrate 250 MG Tabs      Take 2 tablets by mouth daily        cholecalciferol 5000 units Tabs tablet    vitamin D3     Take by mouth daily        magnesium " 250 MG tablet      Take 2 tablets by mouth daily        methenamine hippurate 1 g Tabs tablet    HIPREX     Take 1 g by mouth 2 times daily    Multiple sclerosis (H)       MULTIVITAMIN PO      Take by mouth daily        OMEGA-3 FISH OIL PO      Take by mouth daily

## 2018-08-01 ENCOUNTER — HOSPITAL ENCOUNTER (OUTPATIENT)
Dept: PHYSICAL THERAPY | Facility: CLINIC | Age: 43
Setting detail: THERAPIES SERIES
End: 2018-08-01
Attending: NURSE PRACTITIONER
Payer: MEDICARE

## 2018-08-01 PROCEDURE — 97161 PT EVAL LOW COMPLEX 20 MIN: CPT | Mod: GP | Performed by: PHYSICAL THERAPIST

## 2018-08-01 PROCEDURE — 97110 THERAPEUTIC EXERCISES: CPT | Mod: GP | Performed by: PHYSICAL THERAPIST

## 2018-08-01 PROCEDURE — G8979 MOBILITY GOAL STATUS: HCPCS | Mod: GP,CJ | Performed by: PHYSICAL THERAPIST

## 2018-08-01 PROCEDURE — 40000719 ZZHC STATISTIC PT DEPARTMENT NEURO VISIT: Performed by: PHYSICAL THERAPIST

## 2018-08-01 PROCEDURE — 97116 GAIT TRAINING THERAPY: CPT | Mod: GP | Performed by: PHYSICAL THERAPIST

## 2018-08-01 PROCEDURE — G8978 MOBILITY CURRENT STATUS: HCPCS | Mod: GP,CK | Performed by: PHYSICAL THERAPIST

## 2018-08-02 NOTE — PROGRESS NOTES
"                                                                                              [facility]        OUTPATIENT PHYSICAL THERAPY FUNCTIONAL EVALUATION  PLAN OF TREATMENT FOR OUTPATIENT REHABILITATION  (COMPLETE FOR INITIAL CLAIMS ONLY)  Patient's Last Name, First Name, M.I.  YOB: 1975  MayRusty  BRET     Provider's Name   [facility]   Medical Record No.  [id number]     Start of Care Date:  08/01/18   Onset Date:  07/19/18   Type:     _X__PT   ____OT  ____SLP Medical Diagnosis:  MS     PT Diagnosis:  Gait instability Visits from SOC:  1                              __________________________________________________________________________________  Plan of Treatment/Functional Goals:  balance training, gait training, motor coordination training, neuromuscular re-education, orthotic fitting/training, strengthening, stretching, transfer training           GOALS  Safe gait  Pt will demonstrate safe gait >1000ft including indoor and outdoor surfaces with safe AD/orthosis without toe catching, staggering, or LOB, for safe household and community mobility to access appointments/fitness center.  10/29/18    Safe transfers/5xSTS  Pt will demonstrate stable sit<>stand transfer x5/5 reps, and complete 5xSTS in <12 sec from 17\" chair.  10/29/18    Falls risk reduction  Pt will be able to verbalize 3-5 strategies to reduce his risk of falls, following education.  10/29/18    Floor transfer  Pt will demonstrate independence and safe technique for floor to stand/sit transfer, for safe recovery in the even of fall.  10/29/18    HEP/Fitness program  Pt will demonstrate independence with HEP/fitness program designed to address core and extremity strength, flexibility, balance and conditioning, for disease management and healthy lifestyle.  10/29/18               Therapy Frequency:  1 time/week   Predicted Duration of Therapy Intervention:  90 days    Ludivina Turcios, " PT                                    I CERTIFY THE NEED FOR THESE SERVICES FURNISHED UNDER        THIS PLAN OF TREATMENT AND WHILE UNDER MY CARE     (Physician co-signature of this document indicates review and certification of the therapy plan).                Certification Date From:  08/01/18   Certification Date To:  10/29/18    Referring Provider:  Jyotsna Montez CNP    Initial Assessment  See Epic Evaluation- Start of Care Date: 08/01/18

## 2018-08-02 NOTE — PROGRESS NOTES
08/01/18 1255   Quick Adds   Quick Adds Certification   Type of Visit Initial OP PT Evaluation   General Information   Start of Care Date 08/01/18   Referring Physician Jyotsna Montez CNP   Orders Evaluate and Treat as Indicated   Order Date 07/19/18   Medical Diagnosis MS   Onset of illness/injury or Date of Surgery 07/19/18   Special Instructions Balance and gait   Surgical/Medical history reviewed Yes   Pertinent history of current problem (include personal factors and/or comorbidities that impact the POC) Pt dx MS 2014, likely primary progressive. H/o Bell's Palsy affecting his entire face at age 20.    Pertinent Visual History  Wears glasses but otherwise good. (see observation below)   Previous/Current Treatment Physical Therapy;Occupational Therapy  (Had in-home therapy until recently)   Current Community Support Family/friend caregiver;Other/Comments  (wife and 3 kids, wife works 4 days/week)   Patient role/Employment history Disabled   Living environment House[facility]   Home/Community Accessibility Comments 3 level house, railings on both flights, 3 entry with no railing in front, rail in front- goes okay with close attention to foot placement   Current Assistive Devices Front Wheeled Walker   Assistive Devices Comments Talked with home OT about getting grab bar installed. Tub/shower combo. Showers early in the day before tired/sleepy/hot.   Patient/Family Goals Statement Balance, maintain strength, reduce falls   General Information Comments Wife drove him here today. Takes Lyft to fitness center. 1937 home, old/small. Kids are 9, 8, 4. Over past year notices heat impacts me more than it used to. Notes he's been declining despite trying to stay in shape. Progressive form of MS. 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 Then was laid off of next job d/t  impairments d/t MS limiting ability to do his job.    Fall Risk Screen   Fall screen completed by PT   Have you fallen 2 or more times in the past year? Yes  (at least 52, weekly falls)   Have you fallen and had an injury in the past year? No   Timed Up and Go score (seconds) 10.56  (2ww)   Is patient a fall risk? Yes   Fall screen comments Just fell getting out of the van to come in here, walker got caught up in the door.    Pain   Patient currently in pain No   Vitals Signs   Heart Rate 68   SpO2 100   Blood Pressure 123/75   Cognitive Status Examination   Orientation orientation to person, place and time   Level of Consciousness alert   Follows Commands and Answers Questions 100% of the time   Personal Safety and Judgment intact   Memory intact   Observation   Observation Oculomotor: B eyes appear to bounce during smooth pursuits, but pt denies any blurring or oscillipsia   Integumentary   Integumentary No deficits were identified   Posture   Posture Comments Stands with greater weight on R LE, reduced lumbar curve, mild forward head, prefers widened stance, able to stand without UE support   Range of Motion (ROM)   ROM Comment B UEs WFL. LEs WFL but note HS and gastroc stiffness L>R LE   Strength   Strength Comments B UEs 5/5 throughout, B LEs 5/5 except L hip flexion 4+/5. Core strength appears reduced functionally.    Bed Mobility   Bed Mobility Comments independent to flat mat, no rail   Transfer Skills   Transfer Comments Prefers widened stance, shaky with initial stance, able to stabilize more quickly with UE support of 2ww. Stand>sit with reduced control 3/5 reps.   Gait   Gait Comments Pt amb with 2ww with ataxic gait, widened stance, scuffs L toe often, at times lifting 2 legs of 2ww during straight path gait, lifts full 2ww with turns and is unstable.   Gait Special Tests   Gait Special Tests 25 FOOT TIMED WALK   Gait Special Tests 25 Foot Timed Walk   Seconds 8.41   Steps 12 Steps   Comments 2ww  "  Balance Special Tests   Balance Special Tests Sit to stand reps;Romberg   Balance Special Tests Romberg   Seconds 10 Seconds   Comments mild-moderate sway then slow LOB posteriorly needing Eldon   Balance Special Tests Sit to Stand Reps in 30 Seconds   Comments 5xSTS (17\") without UEs: 15.54 sec, but poorly controlled stand>sit   Sensory Examination   Sensory Perception Comments Denies sensory changes. Intact to light touch B LEs. Proprioception intact 10/10 B great toe.   Coordination   Coordination Comments L>R clumsiness noted with pronation/supination, dysmetria L>R with nose-finger accuracy, slower with thumb to each finger in sequence despite L UE dominant. Toe taps slower L, dysmetria with heel-shin accuracy L.    Muscle Tone   Muscle Tone Comments WNL B LE, no clonus ellicited   Planned Therapy Interventions   Planned Therapy Interventions balance training;gait training;motor coordination training;neuromuscular re-education;orthotic fitting/training;strengthening;stretching;transfer training   Clinical Impression   Criteria for Skilled Therapeutic Interventions Met yes, treatment indicated   PT Diagnosis Gait instability   Influenced by the following impairments Functional strength, coordination, balance, fatigue, activity tolerance.   Functional limitations due to impairments Limited functional activity tolerance and high falls risk   Clinical Presentation Evolving/Changing   Clinical Presentation Rationale Progressive course of disease, just recently started on Ampyra about a week ago, will be changing disease modifying therapy to Ocrevus infusions in near future.    Clinical Decision Making (Complexity) Low complexity   Therapy Frequency 1 time/week   Predicted Duration of Therapy Intervention (days/wks) 90 days   Risk & Benefits of therapy have been explained Yes   Patient, Family & other staff in agreement with plan of care Yes   Education Assessment   Barriers to Learning No barriers   GOALS   PT Eval " "Goals 1;2;3;4;5   Goal 1   Goal Identifier Safe gait   Goal Description Pt will demonstrate safe gait >1000ft including indoor and outdoor surfaces with safe AD/orthosis without toe catching, staggering, or LOB, for safe household and community mobility to access appointments/fitness center.   Target Date 10/29/18   Goal 2   Goal Identifier Safe transfers/5xSTS   Goal Description Pt will demonstrate stable sit<>stand transfer x5/5 reps, and complete 5xSTS in <12 sec from 17\" chair.   Target Date 10/29/18   Goal 3   Goal Identifier Falls risk reduction   Goal Description Pt will be able to verbalize 3-5 strategies to reduce his risk of falls, following education.   Target Date 10/29/18   Goal 4   Goal Identifier Floor transfer   Goal Description Pt will demonstrate independence and safe technique for floor to stand/sit transfer, for safe recovery in the even of fall.   Target Date 10/29/18   Goal 5   Goal Identifier HEP/Fitness program   Goal Description Pt will demonstrate independence with HEP/fitness program designed to address core and extremity strength, flexibility, balance and conditioning, for disease management and healthy lifestyle.   Target Date 10/29/18   Total Evaluation Time   Total Evaluation Time (Minutes) 30   Therapy Certification   Certification date from 08/01/18   Certification date to 10/29/18   Medical Diagnosis MS Gifty Turcios, PT, DPT  Physical Therapist  [facility]  Outpatient Rehabilitation Services, [address]  [place] [address]  [email or web address]  Office: [phone]  Pager: [phone]  Fax: [phone]   "

## 2018-08-24 ENCOUNTER — HOSPITAL ENCOUNTER (OUTPATIENT)
Dept: PHYSICAL THERAPY | Facility: CLINIC | Age: 43
Setting detail: THERAPIES SERIES
End: 2018-08-24
Attending: NURSE PRACTITIONER
Payer: MEDICARE

## 2018-08-24 PROCEDURE — 97116 GAIT TRAINING THERAPY: CPT | Mod: GP | Performed by: PHYSICAL THERAPIST

## 2018-08-24 PROCEDURE — 40000719 ZZHC STATISTIC PT DEPARTMENT NEURO VISIT: Performed by: PHYSICAL THERAPIST

## 2018-08-24 PROCEDURE — 97110 THERAPEUTIC EXERCISES: CPT | Mod: GP | Performed by: PHYSICAL THERAPIST

## 2018-08-24 NOTE — IP AVS SNAPSHOT
MRN:[id number]                      After Visit Summary   8/24/2018    Rusty QUEEN May    MRN: [id number]           Visit Information        Provider Department      8/24/2018 10:30 AM Ludivina Turcios, PT [facility], [place], Physical Therapy - Outpatient        Your next 10 appointments already scheduled     Aug 27, 2018 11:00 AM CDT   Infusion 300 with UC SPEC INFUSION, UC 44 ATC   [facility] Specialty and Procedure ([facility] [place])    [address]  [address]  [place] [id number] [phone]            Aug 31, 2018  9:00 AM CDT   Neuro Treatment with Ludivina Turcios, PT   [facility], [place], Physical Therapy - Outpatient ([facility])    [address]  [place] [address]   [phone]            Oct 30, 2018 10:30 AM CDT   (Arrive by 10:15 AM)   Return Multiple Sclerosis with Dylan Barry MD   [facility] Multiple Sclerosis ([facility] [place])    [address]  [address]  [place] [id number] [phone]                Further instructions from your care team         *Ossur Foot up device- size Large for left ankle    *3 lb cuff weights to put on each side of walker for better stability    MyChart Information     [employer] gives you secure access to your electronic health record. If you see a primary care provider, you can also send messages to your care team and make appointments. If you have questions, please call your primary care clinic.  If you do not have a primary care provider, please call [phone] and they will assist you.        Care EveryWhere ID     This is your Care EveryWhere ID. This could be used by other organizations to access your [place] medical records  [id number]        Equal Access to Services     MARIANA HAJI AH: salazar Motley qaybta kaalmada adeegyada, waxay idiin hayaan adeeg kharash  la'britt ah. So [facility] [phone].    ATENCIÓN: Si habla español, tiene a diallo disposición servicios gratuitos de asistencia lingüística. Llame al [phone].    We comply with applicable federal civil rights laws and [place] laws. We do not discriminate on the basis of race, color, national origin, age, disability, sex, sexual orientation, or gender identity.

## 2018-08-24 NOTE — DISCHARGE INSTRUCTIONS
*Ossur Foot up device- size Large for left ankle    *3 lb cuff weights to put on each side of walker for better stability

## 2018-09-14 ENCOUNTER — HOSPITAL ENCOUNTER (OUTPATIENT)
Dept: PHYSICAL THERAPY | Facility: CLINIC | Age: 43
Setting detail: THERAPIES SERIES
End: 2018-09-14
Attending: NURSE PRACTITIONER
Payer: MEDICARE

## 2018-09-14 DIAGNOSIS — G35 MULTIPLE SCLEROSIS (H): Primary | ICD-10-CM

## 2018-09-14 PROCEDURE — 97110 THERAPEUTIC EXERCISES: CPT | Mod: GP | Performed by: PHYSICAL THERAPIST

## 2018-09-14 PROCEDURE — 40000719 ZZHC STATISTIC PT DEPARTMENT NEURO VISIT: Performed by: PHYSICAL THERAPIST

## 2018-09-14 PROCEDURE — 97112 NEUROMUSCULAR REEDUCATION: CPT | Mod: GP | Performed by: PHYSICAL THERAPIST

## 2018-10-12 NOTE — ADDENDUM NOTE
Encounter addended by: Ludivina Turcios, PT on: 10/12/2018  8:50 AM<BR>     Actions taken: Sign clinical note, Episode resolved

## 2018-10-12 NOTE — PROGRESS NOTES
"Outpatient Physical Therapy Discharge Note     Patient: Rusty QUEEN May  : 1975    Beginning/End Dates of Reporting Period:  18 to 2018    Referring Provider: Jyotsna Montez MD    Therapy Diagnosis: Gait instability     Client Self Report: The day after my last session I thought my abs were sore from the exercise, but then I realized I had bruised my ribs because I fell in the shower onto the edge of the tub. Every night my 10 yr old son asks me, \"dad did you fall today\" which is not what you want your kid to have to think about, but it's sweet that he cares about me.       Goals:  Goal Identifier Safe gait   Goal Description Pt will demonstrate safe gait >1000ft including indoor and outdoor surfaces with safe AD/orthosis without toe catching, staggering, or LOB, for safe household and community mobility to access appointments/fitness center.   Target Date 10/29/18   Date Met      Progress: Not met     Goal Identifier Safe transfers/5xSTS   Goal Description Pt will demonstrate stable sit<>stand transfer x5/5 reps, and complete 5xSTS in <12 sec from 17\" chair.   Target Date 10/29/18   Date Met      Progress: Not met     Goal Identifier Falls risk reduction   Goal Description Pt will be able to verbalize 3-5 strategies to reduce his risk of falls, following education.   Target Date 10/29/18   Date Met      Progress: Partially met     Goal Identifier Floor transfer   Goal Description Pt will demonstrate independence and safe technique for floor to stand/sit transfer, for safe recovery in the even of fall.   Target Date 10/29/18   Date Met      Progress: Not yet attempted     Goal Identifier HEP/Fitness program   Goal Description Pt will demonstrate independence with HEP/fitness program designed to address core and extremity strength, flexibility, balance and conditioning, for disease management and healthy lifestyle.   Target Date 10/29/18   Date Met      Progress: Partially met       Progress Toward Goals: "   Progress this reporting period: pt with initial progress, but limited attendance and report of multiple falls at home. Pt has transitioned to home care PT vs outpatient.    Plan:  Discharge from therapy.    Discharge:    Reason for Discharge: Patient chooses to discontinue therapy.  Medicare G-code: Patient did not attend a final scheduled session prior to discharge. Unable to determine discharge functional status.  Pt transitioned to home care PT.    Equipment Issued: None- recommend Ossur Foot-up device for L foot.     Discharge Plan: Other services: Home care PT.

## 2018-10-30 ENCOUNTER — OFFICE VISIT (OUTPATIENT)
Dept: NEUROLOGY | Facility: CLINIC | Age: 43
End: 2018-10-30
Attending: PSYCHIATRY & NEUROLOGY
Payer: MEDICARE

## 2018-10-30 VITALS
WEIGHT: 191.5 LBS | SYSTOLIC BLOOD PRESSURE: 126 MMHG | DIASTOLIC BLOOD PRESSURE: 84 MMHG | HEIGHT: 75 IN | HEART RATE: 90 BPM | BODY MASS INDEX: 23.81 KG/M2

## 2018-10-30 DIAGNOSIS — G35 MULTIPLE SCLEROSIS (H): Primary | ICD-10-CM

## 2018-10-30 PROCEDURE — G0463 HOSPITAL OUTPT CLINIC VISIT: HCPCS | Mod: ZF

## 2018-10-30 RX ORDER — AMOXICILLIN 250 MG
1 CAPSULE ORAL 2 TIMES DAILY
Qty: 60 TABLET | Refills: 11 | Status: SHIPPED | OUTPATIENT
Start: 2018-10-30

## 2018-10-30 ASSESSMENT — PAIN SCALES - GENERAL: PAINLEVEL: NO PAIN (0)

## 2018-10-30 NOTE — MR AVS SNAPSHOT
After Visit Summary   10/30/2018    Rusty Serrato    MRN: [id number]           Patient Information     Date Of Birth          1975        Visit Information        Provider Department      10/30/2018 10:30 AM Dylan Barry MD [facility] Multiple Sclerosis        Today's Diagnoses     Multiple sclerosis (H)    -  1       Follow-ups after your visit        Follow-up notes from your care team     Return in about 6 months (around 4/30/2019).      Your next 10 appointments already scheduled     Apr 30, 2019 10:30 AM CDT   (Arrive by 10:15 AM)   Return Multiple Sclerosis with Dylan Barry MD   [facility] Multiple Sclerosis ([facility] [place] [facility])    [address]  [address] [place] [id number] [phone]              Who to contact     If you have questions or need follow up information about today's clinic visit or your schedule please contact [facility] MULTIP[facility] SCLEROSIS directly at [phone].  Normal or non-critical lab and imaging results will be communicated to you by MyChart, letter or phone within 4 business days after the clinic has received the results. If you do not hear from us within 7 days, please contact the clinic through [employer] or phone. If you have a critical or abnormal lab result, we will notify you by phone as soon as possible.  Submit refill requests through [employer] or call your pharmacy and they will forward the refill request to us. Please allow 3 business days for your refill to be completed.          Additional Information About Your Visit        MyChart Information     [employer] gives you secure access to your electronic health record. If you see a primary care provider, you can also send messages to your care team and make appointments. If you have questions, please call your primary care clinic.  If you do not have a primary care provider, please call [phone] and they will assist you.        Care EveryWhere ID     This is your  "Care EveryWhere ID. This could be used by other organizations to access your [place] medical records  [id number]        Your Vitals Were     Pulse Height BMI (Body Mass Index)             90 1.905 m (6' 3\") 23.94 kg/m2          Blood Pressure from Last 3 Encounters:   10/30/18 126/84   07/19/18 117/78   04/17/18 131/87    Weight from Last 3 Encounters:   10/30/18 86.9 kg (191 lb 8 oz)   07/19/18 87.1 kg (192 lb)   05/07/15 84.8 kg (187 lb)              Today, you had the following     No orders found for display         Today's Medication Changes          These changes are accurate as of 10/30/18 11:28 AM.  If you have any questions, ask your nurse or doctor.               Start taking these medicines.        Dose/Directions    senna-docusate 8.6-50 MG per tablet   Commonly known as:  SENOKOT-S;PERICOLACE   Used for:  Multiple sclerosis (H)   Started by:  Dylan Barry MD        Dose:  1 tablet   Take 1 tablet by mouth 2 times daily   Quantity:  60 tablet   Refills:  11            Where to get your medicines      These medications were sent to [facility] [address] [place], [place] - [address]  [address] [address]     Phone:  [phone]     senna-docusate 8.6-50 MG per tablet                Primary Care Provider Office Phone # Fax #    Simone Bustillo [phone]       [facility] [address] [address]        Equal Access to Services     [facility] Center[facility] : Hadii medardo vasquezo Soliya, waaxda luqadaha, qaybta kaalmada adeegyada, waxjeannie idirick fay. So [facility] [phone].    ATENCIÓN: Si habla español, tiene a diallo disposición servicios gratuitos de asistencia lingüística. Llame al [phone].    We comply with applicable federal civil rights laws and [place] laws. We do not discriminate on the basis of race, color, national origin, age, disability, sex, sexual orientation, or gender " identity.            Thank you!     Thank you for choosing [facility] MULTIPLE SCLEROSIS  for your care. Our goal is always to provide you with excellent care. Hearing back from our patients is one way we can continue to improve our services. Please take a few minutes to complete the written survey that you may receive in the mail after your visit with us. Thank you!             Your Updated Medication List - Protect others around you: Learn how to safely use, store and throw away your medicines at www.disposemymeds.org.          This list is accurate as of 10/30/18 11:28 AM.  Always use your most recent med list.                   Brand Name Dispense Instructions for use Diagnosis    AMINO ACID PO      Take by mouth daily        AMPYRA PO      Take 5 mg by mouth 3 times daily        calcium citrate 250 MG Tabs      Take 2 tablets by mouth daily        cholecalciferol 5000 units Tabs tablet    vitamin D3     Take by mouth daily        magnesium 250 MG tablet      Take 2 tablets by mouth daily        methenamine hippurate 1 g Tabs tablet    HIPREX     Take 1 g by mouth 2 times daily    Multiple sclerosis (H)       MULTIVITAMIN PO      Take by mouth daily        OMEGA-3 FISH OIL PO      Take by mouth daily        senna-docusate 8.6-50 MG per tablet    SENOKOT-S;PERICOLACE    60 tablet    Take 1 tablet by mouth 2 times daily    Multiple sclerosis (H)

## 2018-10-30 NOTE — LETTER
10/30/2018      RE: Rusty Serrato  [address]  [facility] [address]       Service Date: 10/30/2018      REASON FOR VISIT:  Rusty Serrato is a 43-year-old man who I follow for primary progressive multiple sclerosis.  He returns for routine followup.  I have seen him once previously in 04/2018 and he was last seen in our clinic by Jyotsna Montez in 07/2018.      HISTORY OF PRESENT ILLNESS:  Rusty was recently hospitalized at [facility] with urosepsis.  He was admitted on 10/10 and discharged on the 10/24.  Not surprisingly, his neurologic function worsened in association with that, as a pseudorelapse of his MS.  He did some inpatient rehabilitation under the care of Dr. Mendez and tells me he feels he is back to his baseline.  His residual symptoms include gait impairment, cognitive impairment, neurogenic bladder and bowel and rather prominent limb ataxia.  He is taking generic dalfampridine but cannot really tell if it is helping.  I had prescribed ocrelizumab, but he tells me he is still thinking over whether or not to start that.      Reviewing his chart and talking to him, it is not exactly clear to me whether he is taking generic dalfampridine or compounded 4-aminopyridine.  He tells me he is taking 5 mg 3 times a day, which would suggest the latter. The discharge summary from [facility] also supports this.     PHYSICAL EXAMINATION:  Blood pressure 126/84, pulse 90, weight 191 pounds.  He is alert and oriented.  Affect is a bit stunned, but pleasant.  He has multiple family members accompanying him.  Cranial nerves are notable only for saccadic intrusions into smooth pursuit.  Muscle power in the arms and legs is normal, but finger-nose is dysmetric bilaterally and finger and toe tapping are clumsy.  Reflexes are pathologically brisk at the knees.  Gait is markedly ataxic.      IMPRESSION:  Primary progressive multiple sclerosis with recent pseudoexacerbation in the context of urosepsis with E. coli.         I spent 32 minutes with Rusty today, greater than 50% of which was spent in counseling and coordination of care.  We reviewed the data on ocrelizumab in primary progressive MS.  I told him that while the benefit was rather modest, I still think this is the most likely treatment to benefit him of anything currently available.  We discussed the aminopyridine and whether or not it is helping him.  I told him I think he should take his current dose for another week or so as he continues to recover from the recent hospitalization, then try gradually going up to 10 mg 3 times a day.  If there is no noticeable benefit, he could probably stop it at that point.  We discussed his chronic constipation and I suggested adding a docusate and Senna combination daily as a next step.      PLAN:     1.  Titrate aminopyridine as described above.     2.  Stephany-Colace.     3.  Return to clinic in 6 months.         DEMETRIS JONES MD

## 2018-11-02 NOTE — PROGRESS NOTES
Service Date: 10/30/2018      REASON FOR VISIT:  Rusty Serrato is a 43-year-old man who I follow for primary progressive multiple sclerosis.  He returns for routine followup.  I have seen him once previously in 04/2018 and he was last seen in our clinic by Jyotsna Montez in 07/2018.      HISTORY OF PRESENT ILLNESS:  Rusty was recently hospitalized at [facility] with urosepsis.  He was admitted on 10/10 and discharged on the 10/24.  Not surprisingly, his neurologic function worsened in association with that, as a pseudorelapse of his MS.  He did some inpatient rehabilitation under the care of Dr. Mendez and tells me he feels he is back to his baseline.  His residual symptoms include gait impairment, cognitive impairment, neurogenic bladder and bowel and rather prominent limb ataxia.  He is taking generic dalfampridine but cannot really tell if it is helping.  I had prescribed ocrelizumab, but he tells me he is still thinking over whether or not to start that.      Reviewing his chart and talking to him, it is not exactly clear to me whether he is taking generic dalfampridine or compounded 4-aminopyridine.  He tells me he is taking 5 mg 3 times a day, which would suggest the latter. The discharge summary from [facility] also supports this.     PHYSICAL EXAMINATION:  Blood pressure 126/84, pulse 90, weight 191 pounds.  He is alert and oriented.  Affect is a bit stunned, but pleasant.  He has multiple family members accompanying him.  Cranial nerves are notable only for saccadic intrusions into smooth pursuit.  Muscle power in the arms and legs is normal, but finger-nose is dysmetric bilaterally and finger and toe tapping are clumsy.  Reflexes are pathologically brisk at the knees.  Gait is markedly ataxic.      IMPRESSION:  Primary progressive multiple sclerosis with recent pseudoexacerbation in the context of urosepsis with E. coli.        I spent 32 minutes with Rusty today, greater than 50% of which was spent in  counseling and coordination of care.  We reviewed the data on ocrelizumab in primary progressive MS.  I told him that while the benefit was rather modest, I still think this is the most likely treatment to benefit him of anything currently available.  We discussed the aminopyridine and whether or not it is helping him.  I told him I think he should take his current dose for another week or so as he continues to recover from the recent hospitalization, then try gradually going up to 10 mg 3 times a day.  If there is no noticeable benefit, he could probably stop it at that point.  We discussed his chronic constipation and I suggested adding a docusate and Senna combination daily as a next step.      PLAN:     1.  Titrate aminopyridine as described above.     2.  Stephany-Colace.     3.  Return to clinic in 6 months.         DEMETRIS JONES MD             D: 2018   T: 2018   MT: KATHLEEN      Name:     ISAURA WHEELER   MRN:      [id number]        Account:      [id number]   :      1975           Service Date: 10/30/2018      Document: [id number]

## 2018-11-09 ENCOUNTER — TELEPHONE (OUTPATIENT)
Dept: NEUROLOGY | Facility: CLINIC | Age: 43
End: 2018-11-09

## 2018-11-09 DIAGNOSIS — G35 MULTIPLE SCLEROSIS (H): ICD-10-CM

## 2018-11-09 DIAGNOSIS — N31.9 NEUROGENIC BLADDER: Primary | ICD-10-CM

## 2018-11-09 NOTE — TELEPHONE ENCOUNTER
[facility] Call Center    Phone Message    May a detailed message be left on voicemail: yes    Reason for Call: Other: Pt's mother called to speak with Dr. Barry's nurse with some questions about Neurogenic Bowel Syndrome. The pt has been experiencing constipation, and the medicaiton he was prescribed hasn't been helping. Pt's mother and wife had found some information online about Neurogenic Bowel Syndrome, and they're wondering if that could possibly be the cause of the pt's problems with bowel movements. She said that the pt currently uses a catheter, so she's wondering if maybe the muscles near the bowels are also just not working correctly. Please give her a call back to discuss.     Action Taken: Message routed to:  [facility] ([facility] Neurology

## 2018-11-12 NOTE — TELEPHONE ENCOUNTER
Yes, if his current bowel program isn't working well, he should come in and see Jyotsna to evaluate/adust it.

## 2018-11-12 NOTE — TELEPHONE ENCOUNTER
I called Lizeth back regarding Rusty; She said that Rusty has been having a recurrent pattern of not having a bowel movement for 5-6 days, then having bowel incontinence episodes; Lizeth said that Rusty does take senokot as prescribed by Dr. Barry, as well as Miralax as directed on the bottle daily; She said that it is hard to monitor his water intake; She is in agreement with having him come in for an appointment with Jyotsna Montez; She also asked about Rusty's bladder issues; She said that he self-caths daily, however, he has been having repeated bladder and kidney infections; She asked about Rusty seeing a Urologist who specializes in MS; I told her that we do have urologists at [facility] that work with many of our MS patients, and that I would ask Dr. Barry about a referral; I told her that I felt it would be a very good idea for Rusty to establish with a new urologist; If Dr. Barry is okay with that, I will put that referral order in, then Lizeth would like me to try to coincide the appointment with Jyotsna and urology for the same day; Lizeth understands that I will likely call her back tomorrow with Dr. Barry's input and the plan.    Dinora Larry, MS RN Care Coordinator

## 2018-11-12 NOTE — TELEPHONE ENCOUNTER
I left very generic VMM for patient's mom advising to call back with when she can be reached back.    Dinora Larry, MS RN Care Coordinator

## 2018-11-12 NOTE — TELEPHONE ENCOUNTER
Health Call Center    Phone Message    May a detailed message be left on voicemail: yes    Reason for Call: Other: Pt's mother returning Dinora's call.  Iris is fairly available except between 1pm-2pm.  Please call her back.     Action Taken: Message routed to:  [facility]  Neurology

## 2018-11-12 NOTE — TELEPHONE ENCOUNTER
Dr. Barry, please see message from the call center; Would you like him to see Jyotsna to discuss this issue? Thank you.    Dinora Larry MS RN Care Coordinator

## 2018-11-13 ENCOUNTER — TELEPHONE (OUTPATIENT)
Dept: UROLOGY | Facility: CLINIC | Age: 43
End: 2018-11-13

## 2018-11-13 DIAGNOSIS — N31.9 NEUROGENIC BLADDER: Primary | ICD-10-CM

## 2018-11-13 NOTE — TELEPHONE ENCOUNTER
Urology referral order placed per Dr. Barry; I called Urology and was able to get Rusty in with Dr. Carrillo on 12/17/18 at noon, and an appointment with Jyotsna at 10:30am the same day; He will also have a lab appointment per urology prior to the urology visit; I called Lizeth with this information; She may choose to move up and separate out the two appointments Rusty has, which I told her would certainly be okay and up to them.    Dinora Larry, MS RN Care Coordinator

## 2018-11-13 NOTE — TELEPHONE ENCOUNTER
Message forward to Fito Salinas urology clinic coordinator . Orders are placed in patient chart.    Thanks,Clarence Lopez MA

## 2018-11-13 NOTE — TELEPHONE ENCOUNTER
Health Call Center    Phone Message    May a detailed message be left on voicemail: yes    Reason for Call: Other: Pt is scheduled for a Neurogenic Bladder appointment on 12/17 at 12pm.  Pt is wheelchair bound.  Dinora from Neurology is trying to coordinate care and appts for pt.  Pt needs renal U/S and labs for Neurogenic Bladder.  Please schedule this between 11 and 11:15 on 12/17    Action Taken: Message routed to:  [facility] Urology

## 2018-11-14 ENCOUNTER — PRE VISIT (OUTPATIENT)
Dept: UROLOGY | Facility: CLINIC | Age: 43
End: 2018-11-14

## 2018-11-14 NOTE — TELEPHONE ENCOUNTER
MEDICAL RECORDS REQUEST   [facility]  Urology Clinic  [address] [place] [address]  PHONE: [phone]  Fax: [phone]        FUTURE VISIT INFORMATION                                                   Rusty QUEEN May, : 1975 scheduled for future visit at [facility]    APPOINTMENT INFORMATION:    Date: 2018    Provider: Benito Carrillo    Reason for Visit/Diagnosis: NGB    REFERRAL INFORMATION:    Referring provider:  Dylan Barry    Specialty: MD    Referring providers clinic:  Neurology    Clinic contact number:  [phone]    RECORDS REQUESTED FOR VISIT                                                     NOTES  STATUS/DETAILS   OFFICE NOTE from referring provider  yes   OFFICE NOTE from other specialist  yes   DISCHARGE SUMMARY from hospital  no   DISCHARGE REPORT from the ER  no   OPERATIVE REPORT  no   MEDICATION LIST  yes   NEUROGENIC BLADDER      CT ABDOMEN/PELVIS (IMAGES & REPORT)  yes   CYSTOGRAM (IMAGES & REPORT)  no   IVP (IMAGES & REPORT)  no   KUB (IMAGES & REPORT)  no   LABS  yes   RENAL/BLADDER ULTRASOUND (IMAGES & REPORT)  in process       PRE-VISIT CHECKLIST      Record collection complete Yes  Waiting on imaging   Appointment appropriately scheduled           (right time/right provider) Yes   MyChart activation yes   Questionnaire complete If no, please explain in process     Completed by: Chrissie Breen

## 2018-11-23 ENCOUNTER — PRE VISIT (OUTPATIENT)
Dept: UROLOGY | Facility: CLINIC | Age: 43
End: 2018-11-23

## 2018-11-23 NOTE — TELEPHONE ENCOUNTER
Reason for visit: Neurogenic bladder     Relevant information: multiple sclerosis    Records/imaging/labs: all appointments scheduled    Pt called: no need for a call    Rooming: regular

## 2018-11-26 ENCOUNTER — OFFICE VISIT (OUTPATIENT)
Dept: NEUROLOGY | Facility: CLINIC | Age: 43
End: 2018-11-26
Attending: NURSE PRACTITIONER
Payer: MEDICARE

## 2018-11-26 VITALS
HEART RATE: 76 BPM | BODY MASS INDEX: 23.94 KG/M2 | SYSTOLIC BLOOD PRESSURE: 116 MMHG | HEIGHT: 75 IN | DIASTOLIC BLOOD PRESSURE: 76 MMHG

## 2018-11-26 DIAGNOSIS — K59.2 NEUROGENIC BOWEL: Primary | ICD-10-CM

## 2018-11-26 DIAGNOSIS — G35 MULTIPLE SCLEROSIS (H): Primary | ICD-10-CM

## 2018-11-26 DIAGNOSIS — G35 MULTIPLE SCLEROSIS (H): ICD-10-CM

## 2018-11-26 DIAGNOSIS — K59.09 CHRONIC CONSTIPATION: ICD-10-CM

## 2018-11-26 PROCEDURE — G0463 HOSPITAL OUTPT CLINIC VISIT: HCPCS | Mod: ZF

## 2018-11-26 ASSESSMENT — PAIN SCALES - GENERAL: PAINLEVEL: NO PAIN (0)

## 2018-11-26 NOTE — PATIENT INSTRUCTIONS
1. No medication changes today.    2. Continue home PT as scheduled.     3. Continue current bowel medications for constipation.     4. GI referral for chronic constipation. I will contact you with further details.     5. Please keep track of your daily water intake and bowel movements.

## 2018-11-26 NOTE — PROGRESS NOTES
Patient was in for an office visit with Jyotsna Montez today; The patient needs a referral to GI, which I have placed per Jyotsna's request for assistance.    Dinora Larry, MS RN Care Coordinator

## 2018-11-26 NOTE — MR AVS SNAPSHOT
After Visit Summary   11/26/2018    Rusty Serrato    MRN: [id number]           Patient Information     Date Of Birth          1975        Visit Information        Provider Department      11/26/2018 12:00 PM Jyotsna Montez APRN [facility]        Care Instructions    1. No medication changes today.    2. Continue home PT as scheduled.     3. Continue current bowel medications for constipation.     4. GI referral for chronic constipation. I will contact you with further details.     5. Please keep track of your daily water intake and bowel movements.             Follow-ups after your visit        Your next 10 appointments already scheduled     Dec 03, 2018 12:45 PM CST   LAB with  LAB   [facility] ([facility])    [address] [place] [id number] [phone]           Please do not eat 10-12 hours before your appointment if you are coming in fasting for labs on lipids, cholesterol, or glucose (sugar). This does not apply to pregnant women. Water, hot tea and black coffee (with nothing added) are okay. Do not drink other fluids, diet soda or chew gum.            Dec 03, 2018  1:00 PM CST   US RENAL COMPLETE with UCUS1   [facility] US ([facility])    [address] [place] [address] [phone]           How do I prepare for my exam? (Food and drink instructions) No Food and Drink Restrictions.  How do I prepare for my exam? (Other instructions) You do not need to do anything special to prepare for your exam.  What should I wear: Wear comfortable clothes.  How long does the exam take: Most ultrasounds take 30 to 60 minutes.  What should I bring: Bring a list of your medicines, including vitamins, minerals and over-the-counter drugs. It is safest to leave personal items at home.  Do I need a :  No  is needed.  What do I need to tell my doctor:  Tell your doctor about any allergies you may have.  What should I do after the exam: No restrictions, You may resume normal activities.  What is this test: An ultrasound uses sound waves to make pictures of the body. Sound waves do not cause pain. The only discomfort may be the pressure of the wand against your skin or full bladder.  Who should I call with questions: If you have any questions, please call the Imaging Department where you will have your exam. Directions, parking instructions, and other information is available on our website, .[email or web address].            Dec 03, 2018  2:00 PM CST   (Arrive by 1:45 PM)   NEW NEUROGENIC BLADDER with Benito Carrillo MD   [facility] ([facility])    [address] [place] [address]  4th Floor  [facility] [address] [phone]            Apr 30, 2019 10:30 AM CDT   (Arrive by 10:15 AM)   Return Multiple Sclerosis with Dylan Barry MD   [facility] ([facility])    [address] [place] [address]  [address] [place] [address] [phone]              Who to contact     If you have questions or need follow up information about today's clinic visit or your schedule please contact [facility] MULTIPLE SCLEROSIS directly at [phone].  Normal or non-critical lab and imaging results will be communicated to you by MyChart, letter or phone within 4 business days after the clinic has received the results. If you do not hear from us within 7 days, please contact the clinic through MyChart or phone. If you have a critical or abnormal lab result, we will notify you by phone as soon as possible.  Submit refill requests through [employer] now [employer] or call your pharmacy and they will forward the refill request to us. Please allow 3 business days for your refill to be completed.          Additional Information About Your Visit        [employer] Information     [employer] gives  "you secure access to your electronic health record. If you see a primary care provider, you can also send messages to your care team and make appointments. If you have questions, please call your primary care clinic.  If you do not have a primary care provider, please call [phone] and they will assist you.        Care EveryWhere ID     This is your Care EveryWhere ID. This could be used by other organizations to access your [place] medical records  [id number]        Your Vitals Were     Pulse Height BMI (Body Mass Index)             76 1.905 m (6' 3\") 23.94 kg/m2          Blood Pressure from Last 3 Encounters:   11/26/18 116/76   10/30/18 126/84   07/19/18 117/78    Weight from Last 3 Encounters:   10/30/18 86.9 kg (191 lb 8 oz)   07/19/18 87.1 kg (192 lb)   05/07/15 84.8 kg (187 lb)              Today, you had the following     No orders found for display       Primary Care Provider Office Phone # Fax #    Simone Bustillo [phone]       [facility] [address] [facility] [address]        Equal Access to Services     [facility] : Hadii aad ku hadasho Soomaali, waaxda luqadaha, qaybta kaalmada adeegyada, waxay sophie haymercedesn barbi pan ah. So [facility] [phone].    ATENCIÓN: Si habla español, tiene a diallo disposición servicios gratuitos de asistencia lingüística. Llame al [phone].    We comply with applicable federal civil rights laws and [place] laws. We do not discriminate on the basis of race, color, national origin, age, disability, sex, sexual orientation, or gender identity.            Thank you!     Thank you for choosing [facility] MULTIPLE SCLEROSIS  for your care. Our goal is always to provide you with excellent care. Hearing back from our patients is one way we can continue to improve our services. Please take a few minutes to complete the written survey that you may receive in the mail after your visit with us. Thank you!             Your " Updated Medication List - Protect others around you: Learn how to safely use, store and throw away your medicines at www.disposemymeds.org.          This list is accurate as of 11/26/18 12:50 PM.  Always use your most recent med list.                   Brand Name Dispense Instructions for use Diagnosis    AMINO ACID PO      Take by mouth daily        AMPYRA PO      Take 5 mg by mouth 3 times daily        calcium citrate 250 MG Tabs      Take 2 tablets by mouth daily        cholecalciferol 5000 units Tabs tablet    vitamin D3     Take by mouth daily        magnesium 250 MG tablet      Take 2 tablets by mouth daily        methenamine 1 g tablet    HIPREX     Take 1 g by mouth 2 times daily    Multiple sclerosis (H)       MULTIVITAMIN PO      Take by mouth daily        OMEGA-3 FISH OIL PO      Take by mouth daily        senna-docusate 8.6-50 MG per tablet    SENOKOT-S;PERICOLACE    60 tablet    Take 1 tablet by mouth 2 times daily    Multiple sclerosis (H)

## 2018-11-26 NOTE — LETTER
"11/26/2018       RE: Rusty QUEEN May  [address]  [facility] [address]     Dear Colleague,    Thank you for referring your patient, Rusty QUEEN May, to the [facility] MULTIPLE SCLEROSIS at [facility]. Please see a copy of my visit note below.    [facility] OUTPATIENT MULTIPLE SCLEROSIS CLINIC VISIT NOTE      REASON FOR VISIT: Rusty is a 43 year old male who presents to the clinic today for regularly scheduled follow up of Primary Progressive MS. He was most recently seen by Dr. Barry on 10/30/2018. He presents to the evaluation with his parents.     HISTORY OF PRESENT ILLNESS: Per previous notes.     INTERVAL HISTORY SINCE LAST VISIT: Rusty was seen by Dr. Barry on 10/30/2018.  Prior to that he was admitted to [facility] with a urosepsis.  Today patient tells me that he had a second UTI in the beginning of November and is currently undergoing treatment with Macrobid. His leg weakness and balance got worse after these infections. He currently has home physical therapy twice a week. He continues to be on generic dalfampridine and he is not sure whether this medication is actually helping him.  He was prescribed ocrelizumab by Dr. Barry in the past but patient decided to hold off on the treatment for now.    Patient continues to have \"frequent falls\" at home. He is not sure how frequently these are happening due to poor memory. No major injuries from these falls.  Sounds like majority of the time patient forgets to use his cane or a walker while he is inside his house.  Patient has neurogenic bladder.  He straight cath 4 times a day.  He is also on Hiprex 1 mg twice daily.  He was referred to urology and has an upcoming appointment with them in the beginning of December.    Today his major concern was constipation. He currently takes  Senna 1 mg daily.  He was prescribed MiraLAX during the past visit with Dr. Barry but unfortunately had " "\"explosive diarrhea when he took that medication.  So he stopped using it. He drinks adequate fluids and eats a decent amount of fruits and veggies. Initially he reported that he has BMs every 2-3 days. But he has poor memory, so he is not that sure when I asked him about the frequency again.     PHYSICAL EXAMINATION:   VITAL SIGNS: /76 (BP Location: Right arm, Patient Position: Chair, Cuff Size: Adult Regular)  Pulse 76  Ht 1.905 m (6' 3\")  BMI 23.94 kg/m2   MENTAL STATUS: Alert,awake and oriented times four.   CRANIAL NERVES: The pupils are equal, round and react to light and there is no Rajiv Greg pupil. Funduscopic examination demonstrates no optic pallor, papilledema or retinal hemorrhage/exudate. Extraocular movements are intact with no internuclear ophthalmoplegia. No nystagmus. Facial strength and sensation are normal. Hearing is normal. Palate elevation and tongue protrusion are normal.   POWER: Strength is normal (5/5) in the following muscles/groups: Deltoids, biceps, triceps, wrist extensors, right finger abductors, knee flexors, foot dorsiflexors. Left finger abductors and bilateral hip flexors are slightly weak at 4+.   SENSORY: Intact to light touch.    MOTOR/CEREBELLAR: There are no tremors, myoclonus or other abnormal movements. Tone is within normal limits in the limbs. Finger and toe tapping were clumsy and there was dysmetria on finger-nose-finger bilaterally. There is no pronator drift.   GAIT: deferred    ASSESSMENT/ PLAN:   1. Primary Progressive MS: Exam fairly stable compared to previous exam. Ocrelizumab was recommended, but patient would like to hold off on it for now. He will follow up with Dr. Barry as scheduled.     2. Constipation: I recommended bulk forming agents Vs GI referral. He prefers latter option. A referral was made and clinic coordinators to call and schedule this appointment. I also encouraged him to keep a diary of his water intake and BMs     3. Gait " disturbance and frequent falls: I encouraged the patient to use either a cane or walker all times to minimize his falls. He agrees. He will continue home PT for now.     4. Please contact us with questions or concerns.     I spent 45 minutes with this patient today, greater than 50% of which was spent in counseling and coordination of care.     This note was generated using voice recognition software. While edited for content some inaccurate phrasing may be found.    Again, thank you for allowing me to participate in the care of your patient.      Sincerely,    YUVAL Gordillo CNP

## 2018-11-27 NOTE — PROGRESS NOTES
"[facility] OUTPATIENT MULTIPLE SCLEROSIS CLINIC VISIT NOTE      REASON FOR VISIT: Rusty is a 43 year old male who presents to the clinic today for regularly scheduled follow up of Primary Progressive MS. He was most recently seen by Dr. Barry on 10/30/2018. He presents to the evaluation with his parents.     HISTORY OF PRESENT ILLNESS: Per previous notes.     INTERVAL HISTORY SINCE LAST VISIT: Rusty was seen by Dr. Barry on 10/30/2018.  Prior to that he was admitted to [facility] with a urosepsis.  Today patient tells me that he had a second UTI in the beginning of November and is currently undergoing treatment with Macrobid. His leg weakness and balance got worse after these infections. He currently has home physical therapy twice a week. He continues to be on generic dalfampridine and he is not sure whether this medication is actually helping him.  He was prescribed ocrelizumab by Dr. Barry in the past but patient decided to hold off on the treatment for now.    Patient continues to have \"frequent falls\" at home. He is not sure how frequently these are happening due to poor memory. No major injuries from these falls.  Sounds like majority of the time patient forgets to use his cane or a walker while he is inside his house.  Patient has neurogenic bladder.  He straight cath 4 times a day.  He is also on Hiprex 1 mg twice daily.  He was referred to urology and has an upcoming appointment with them in the beginning of December.    Today his major concern was constipation. He currently takes  Senna 1 mg daily.  He was prescribed MiraLAX during the past visit with Dr. Barry but unfortunately had \"explosive diarrhea when he took that medication.  So he stopped using it. He drinks adequate fluids and eats a decent amount of fruits and veggies. Initially he reported that he has BMs every 2-3 days. But he has poor memory, so he is not that sure when I asked him about the frequency " "again.     PHYSICAL EXAMINATION:   VITAL SIGNS: /76 (BP Location: Right arm, Patient Position: Chair, Cuff Size: Adult Regular)  Pulse 76  Ht 1.905 m (6' 3\")  BMI 23.94 kg/m2   MENTAL STATUS: Alert,awake and oriented times four.   CRANIAL NERVES: The pupils are equal, round and react to light and there is no Rajiv Greg pupil. Funduscopic examination demonstrates no optic pallor, papilledema or retinal hemorrhage/exudate. Extraocular movements are intact with no internuclear ophthalmoplegia. No nystagmus. Facial strength and sensation are normal. Hearing is normal. Palate elevation and tongue protrusion are normal.   POWER: Strength is normal (5/5) in the following muscles/groups: Deltoids, biceps, triceps, wrist extensors, right finger abductors, knee flexors, foot dorsiflexors. Left finger abductors and bilateral hip flexors are slightly weak at 4+.   SENSORY: Intact to light touch.    MOTOR/CEREBELLAR: There are no tremors, myoclonus or other abnormal movements. Tone is within normal limits in the limbs. Finger and toe tapping were clumsy and there was dysmetria on finger-nose-finger bilaterally. There is no pronator drift.   GAIT: deferred      ASSESSMENT/ PLAN:   1. Primary Progressive MS: Exam fairly stable compared to previous exam. Ocrelizumab was recommended, but patient would like to hold off on it for now. He will follow up with Dr. Barry as scheduled.     2. Constipation: I recommended bulk forming agents Vs GI referral. He prefers latter option. A referral was made and clinic coordinators to call and schedule this appointment. I also encouraged him to keep a diary of his water intake and BMs     3. Gait disturbance and frequent falls: I encouraged the patient to use either a cane or walker all times to minimize his falls. He agrees. He will continue home PT for now.     4. Please contact us with questions or concerns.     Jyotsna Montez CNP  [facility]          I spent 45 minutes with " this patient today, greater than 50% of which was spent in counseling and coordination of care.     This note was generated using voice recognition software. While edited for content some inaccurate phrasing may be found.

## 2018-12-03 ENCOUNTER — OFFICE VISIT (OUTPATIENT)
Dept: UROLOGY | Facility: CLINIC | Age: 43
End: 2018-12-03
Payer: COMMERCIAL

## 2018-12-03 ENCOUNTER — RADIANT APPOINTMENT (OUTPATIENT)
Dept: ULTRASOUND IMAGING | Facility: CLINIC | Age: 43
End: 2018-12-03
Attending: UROLOGY
Payer: COMMERCIAL

## 2018-12-03 VITALS
BODY MASS INDEX: 23 KG/M2 | SYSTOLIC BLOOD PRESSURE: 125 MMHG | HEART RATE: 86 BPM | DIASTOLIC BLOOD PRESSURE: 83 MMHG | HEIGHT: 75 IN | WEIGHT: 185 LBS

## 2018-12-03 DIAGNOSIS — N31.9 NEUROGENIC BLADDER: ICD-10-CM

## 2018-12-03 DIAGNOSIS — G35 MULTIPLE SCLEROSIS (H): ICD-10-CM

## 2018-12-03 DIAGNOSIS — N31.9 NEUROGENIC BLADDER: Primary | ICD-10-CM

## 2018-12-03 LAB
ANION GAP SERPL CALCULATED.3IONS-SCNC: 6 MMOL/L (ref 3–14)
BUN SERPL-MCNC: 16 MG/DL (ref 7–30)
CALCIUM SERPL-MCNC: 8.6 MG/DL (ref 8.5–10.1)
CHLORIDE SERPL-SCNC: 107 MMOL/L (ref 94–109)
CO2 SERPL-SCNC: 26 MMOL/L (ref 20–32)
CREAT SERPL-MCNC: 1.02 MG/DL (ref 0.66–1.25)
GFR SERPL CREATININE-BSD FRML MDRD: 80 ML/MIN/1.7M2
GLUCOSE SERPL-MCNC: 72 MG/DL (ref 70–99)
POTASSIUM SERPL-SCNC: 3.8 MMOL/L (ref 3.4–5.3)
SODIUM SERPL-SCNC: 140 MMOL/L (ref 133–144)
VIT B12 SERPL-MCNC: 360 PG/ML (ref 193–986)

## 2018-12-03 ASSESSMENT — PAIN SCALES - GENERAL: PAINLEVEL: NO PAIN (0)

## 2018-12-03 NOTE — LETTER
12/3/2018       RE: Rusty Serrato  [address]  [facility] [address]     Dear Colleague,    Thank you for referring your patient, Rusty Serrato, to the [facility] UROLOGY AND INST FOR PROSTATE AND UROLOGIC CANCERS at [facility]. Please see a copy of my visit note below.    NEW CONSULT FOR NEUROGENIC BLADDER    Name: Rusty Serrato    MRN: [id number]   YOB: 1975  Accompanied at today's visit by: Parents               Chief Complaint:   Neurogenic Bladder          History of Present Illness:   Rusty Serrato is a 43 year old M with a history of neurogenic bladder secondary to Primary Progressive MS. He is not wheelchair bound.    Previous Bladder Surgeries:  Previous Bladder Augmentation: none  Catheterizable stoma:none  Anti-incontinence procedures: none  Botox injections: None    Pertinent Medications:  Current Anticholinergics: None  Current Prophylactic antibiotics: None (was previously on Macrobid but was switched to Hiprex 18 months ago)  Intravesical gentamycin:  None  Intravesical oxybutinin: None    Catheterization History:  The patient catheterizes per native urethra into a unaugmented native bladder with a 14F straight catheter 4 times per day. Catheterization is performed by self. The patient uses a new catheter each time. He does not irrigate the bladder. Has been catheterizing since 2014    Incontinence History:  He does not leak between voids/caths. He does not experience urgency of urination. He does not experience stress urinary incontinence with the following activities    Urinary Tract Infection History:  Treated with antibiotics for positive culture plus symptoms of UTI: 3 times in the last year. When he was first diagnosed with MS he had recurrent UTIs and was started on prophylactic macrobid. This was stopped and switched to Hiprex ~18 months ago. The patient was doing very well on the Hiprex and did not have any UTIs up until four months ago, however  "over the past 4 months he has had 3 symptomatic UTIs (one episode of pyelonephritis requiring prolonged hospitalization).    Bowel Movement History:  The patient has a bowel movement q5 days. Bowel regimen includes           Past Medical History:   MS  HLD         Past Surgical History:   None         Social History:   Rare etoh, no tobacco, no IVDU         Allergies:   Sulfa (rash as a child)         Medications:     Current Outpatient Prescriptions   Medication Sig     Amino Acids (AMINO ACID PO) Take by mouth daily     calcium citrate 250 MG TABS Take 2 tablets by mouth daily     Cholecalciferol (VITAMIN D3) 5000 UNITS TABS Take by mouth daily     Dalfampridine (AMPYRA PO) Take 5 mg by mouth 3 times daily     magnesium 250 MG tablet Take 2 tablets by mouth daily     methenamine hippurate (HIPREX) 1 g TABS tablet Take 1 g by mouth 2 times daily     Multiple Vitamins-Minerals (MULTIVITAMIN OR) Take by mouth daily     Omega-3 Fatty Acids (OMEGA-3 FISH OIL PO) Take by mouth daily     senna-docusate (SENOKOT-S;PERICOLACE) 8.6-50 MG per tablet Take 1 tablet by mouth 2 times daily     No current facility-administered medications for this visit.           Physical Exam:   /83  Pulse 86  Ht 1.905 m (6' 3\")  Wt 83.9 kg (185 lb)  BMI 23.12 kg/m2  Psych: odd affect, slightly disinhibited  General: Age-appropriate appearing male in NAD sitting in an exam chair.    Resp: No respiratory distress  CV: Heart rate regular  Abdomen: Degree of obesity is none.  : Not performed  Rectal exam: Not performed  LE: Edema is none   Motor: UE normal but too weak to walk           Data:    Imagin/3/2018 RBUS  Right kidney: Measures 11.2 cm in length. Parenchyma is of normal  thickness and echogenicity. No focal mass. No hydronephrosis.     Left kidney: Measures 10.5 cm in length. Parenchyma is of normal  thickness and echogenicity. No focal mass. No hydronephrosis.      Bladder: Circumferential bladder wall thickening is " compatible with  the patient's history of neurogenic bladder. No bladder calculus.    Labs:  Creatinine 1.02    Outside records:  I spent 15 minutes reviewing outside records. All past surgical history information was obtained from prior records and are as detailed in HPI.         Assessment and Plan:   43 year old male with neurogenic bladder secondary to MS, complicated by recurrent urinary tract infections.    -Schedule UDS, if DO present then start anticholinergics  -Reinforced hygienic CIC practices    Roberto Toney MD  Urology Resident    Patient was seen and examined with Dr. Carrillo     =======================================================  As the attending surgeon I, Benito Carrillo, interviewed and examined the patient. The plan was developed between me and the patient. My findings and plan are as stated by the resident.    Benito Carrillo MD

## 2018-12-03 NOTE — NURSING NOTE
Chief Complaint   Patient presents with     Consult     Neurogenic bladder                  Alda Trujillo MA

## 2018-12-03 NOTE — MR AVS SNAPSHOT
After Visit Summary   12/3/2018    Rusty Serrato    MRN: [id number]           Patient Information     Date Of Birth          1975        Visit Information        Provider Department      12/3/2018 2:00 PM Benito Carrillo MD [facility]        Care Instructions    Schedule urodynamics.        It was a pleasure meeting with you today.  Thank you for allowing me and my team the privilege of caring for you today.  YOU are the reason we are here, and I truly hope we provided you with the excellent service you deserve.  Please let us know if there is anything else we can do for you so that we can be sure you are leaving completely satisfied with your care experience.                  Follow-ups after your visit        Your next 10 appointments already scheduled     Mar 14, 2019 10:00 AM CDT   (Arrive by 9:45 AM)   New Patient Visit with Marcelino Soto PA-C   [facility] ([facility])    [address] [place] [id number] [phone]            Mar 15, 2019  9:20 AM CDT   (Arrive by 9:05 AM)   New Patient Visit with Chantell Wisdom MD   [facility] ([facility])    [address] [place] [id number] [phone]            Apr 30, 2019 10:30 AM CDT   (Arrive by 10:15 AM)   Return Multiple Sclerosis with Dylan Barry MD   [facility] ([facility])    [address] [place] [id number] [phone]              Who to contact     Please call your clinic at [phone] to:    Ask questions about your health    Make or cancel appointments    Discuss your medicines    Learn about your test results    Speak to your doctor            Additional Information About Your Visit        MyChart Information     MyChart gives you  "secure access to your electronic health record. If you see a primary care provider, you can also send messages to your care team and make appointments. If you have questions, please call your primary care clinic.  If you do not have a primary care provider, please call [phone] and they will assist you.      [email or web address] is an electronic gateway that provides easy, online access to your medical records. With [email or web address], you can request a clinic appointment, read your test results, renew a prescription or communicate with your care team.     To access your existing account, please contact your [facility] or call [phone] for assistance.        Care EveryWhere ID     This is your Care EveryWhere ID. This could be used by other organizations to access your [facility] medical records  [id number]        Your Vitals Were     Pulse Height BMI (Body Mass Index)             86 1.905 m (6' 3\") 23.12 kg/m2          Blood Pressure from Last 3 Encounters:   12/03/18 125/83   11/26/18 116/76   10/30/18 126/84    Weight from Last 3 Encounters:   12/03/18 83.9 kg (185 lb)   10/30/18 86.9 kg (191 lb 8 oz)   07/19/18 87.1 kg (192 lb)              Today, you had the following     No orders found for display       Primary Care Provider Office Phone # Fax #    Simone Bustillo [phone]       [facility] [address] [facility] [address]        Equal Access to Services     MARIANA HAJI : Hadii aad ku hadasho Soomaali, waaxda luqadaha, qaybta kaalmada adeegyada, tony pan . So [facility] [phone].    ATENCIÓN: Si habla español, tiene a diallo disposición servicios gratuitos de asistencia lingüística. Llame al [phone].    We comply with applicable federal civil rights laws and [place] laws. We do not discriminate on the basis of race, color, national origin, age, disability, sex, sexual orientation, or gender identity.          "   Thank you!     Thank you for choosing [facility] UROLOG[facility] AND [facility] FOR PROSTATE AND UROLOGIC CANCERS  for your care. Our goal is always to provide you with excellent care. Hearing back from our patients is one way we can continue to improve our services. Please take a few minutes to complete the written survey that you may receive in the mail after your visit with us. Thank you!             Your Updated Medication List - Protect others around you: Learn how to safely use, store and throw away your medicines at www.disposemymeds.org.          This list is accurate as of 12/3/18  4:17 PM.  Always use your most recent med list.                   Brand Name Dispense Instructions for use Diagnosis    AMINO ACID PO      Take by mouth daily        AMPYRA PO      Take 5 mg by mouth 3 times daily        calcium citrate 250 MG Tabs      Take 2 tablets by mouth daily        cholecalciferol 5000 units Tabs tablet    vitamin D3     Take by mouth daily        magnesium 250 MG tablet      Take 2 tablets by mouth daily        methenamine 1 g tablet    HIPREX     Take 1 g by mouth 2 times daily    Multiple sclerosis (H)       MULTIVITAMIN PO      Take by mouth daily        OMEGA-3 FISH OIL PO      Take by mouth daily        senna-docusate 8.6-50 MG tablet    SENOKOT-S/PERICOLACE    60 tablet    Take 1 tablet by mouth 2 times daily    Multiple sclerosis (H)

## 2018-12-03 NOTE — PATIENT INSTRUCTIONS
Schedule urodynamics.        It was a pleasure meeting with you today.  Thank you for allowing me and my team the privilege of caring for you today.  YOU are the reason we are here, and I truly hope we provided you with the excellent service you deserve.  Please let us know if there is anything else we can do for you so that we can be sure you are leaving completely satisfied with your care experience.

## 2018-12-03 NOTE — PROGRESS NOTES
NEW CONSULT FOR NEUROGENIC BLADDER    Name: Rusty Serrato    MRN: [id number]   YOB: 1975  Accompanied at today's visit by: Parents               Chief Complaint:   Neurogenic Bladder          History of Present Illness:   Rusty Serrato is a 43 year old M with a history of neurogenic bladder secondary to Primary Progressive MS. He is not wheelchair bound.    Previous Bladder Surgeries:  Previous Bladder Augmentation: none  Catheterizable stoma:none  Anti-incontinence procedures: none  Botox injections: None    Pertinent Medications:  Current Anticholinergics: None  Current Prophylactic antibiotics: None (was previously on Macrobid but was switched to Hiprex 18 months ago)  Intravesical gentamycin:  None  Intravesical oxybutinin: None    Catheterization History:  The patient catheterizes per native urethra into a unaugmented native bladder with a 14F straight catheter 4 times per day. Catheterization is performed by self. The patient uses a new catheter each time. He does not irrigate the bladder. Has been catheterizing since 2014    Incontinence History:  He does not leak between voids/caths. He does not experience urgency of urination. He does not experience stress urinary incontinence with the following activities    Urinary Tract Infection History:  Treated with antibiotics for positive culture plus symptoms of UTI: 3 times in the last year. When he was first diagnosed with MS he had recurrent UTIs and was started on prophylactic macrobid. This was stopped and switched to Hiprex ~18 months ago. The patient was doing very well on the Hiprex and did not have any UTIs up until four months ago, however over the past 4 months he has had 3 symptomatic UTIs (one episode of pyelonephritis requiring prolonged hospitalization).    Bowel Movement History:  The patient has a bowel movement q5 days. Bowel regimen includes           Past Medical History:   MS  HLD         Past Surgical History:   None         Social  "History:   Rare etoh, no tobacco, no IVDU         Allergies:   Sulfa (rash as a child)         Medications:     Current Outpatient Prescriptions   Medication Sig     Amino Acids (AMINO ACID PO) Take by mouth daily     calcium citrate 250 MG TABS Take 2 tablets by mouth daily     Cholecalciferol (VITAMIN D3) 5000 UNITS TABS Take by mouth daily     Dalfampridine (AMPYRA PO) Take 5 mg by mouth 3 times daily     magnesium 250 MG tablet Take 2 tablets by mouth daily     methenamine hippurate (HIPREX) 1 g TABS tablet Take 1 g by mouth 2 times daily     Multiple Vitamins-Minerals (MULTIVITAMIN OR) Take by mouth daily     Omega-3 Fatty Acids (OMEGA-3 FISH OIL PO) Take by mouth daily     senna-docusate (SENOKOT-S;PERICOLACE) 8.6-50 MG per tablet Take 1 tablet by mouth 2 times daily     No current facility-administered medications for this visit.              Review of Systems:    ROS: 14 point ROS neg other than the symptoms noted above in the HPI and PMH.          Physical Exam:   /83  Pulse 86  Ht 1.905 m (6' 3\")  Wt 83.9 kg (185 lb)  BMI 23.12 kg/m2  Psych: odd affect, slightly disinhibited  General: Age-appropriate appearing male in NAD sitting in an exam chair.    Resp: No respiratory distress  CV: Heart rate regular  Abdomen: Degree of obesity is none.  : Not performed  Rectal exam: Not performed  LE: Edema is none   Motor: UE normal but too weak to walk           Data:    Imagin/3/2018 RBUS  Right kidney: Measures 11.2 cm in length. Parenchyma is of normal  thickness and echogenicity. No focal mass. No hydronephrosis.     Left kidney: Measures 10.5 cm in length. Parenchyma is of normal  thickness and echogenicity. No focal mass. No hydronephrosis.      Bladder: Circumferential bladder wall thickening is compatible with  the patient's history of neurogenic bladder. No bladder calculus.    Labs:  Creatinine 1.02    Outside records:  I spent 15 minutes reviewing outside records. All past surgical " history information was obtained from prior records and are as detailed in HPI.         Assessment and Plan:   43 year old male with neurogenic bladder secondary to MS, complicated by recurrent urinary tract infections.    -Schedule UDS, if DO present then start anticholinergics  -Reinforced hygienic CIC practices    Roberto Toney MD  Urology Resident    Patient was seen and examined with Dr. Carrillo     =======================================================  As the attending surgeon I, Benito Carrillo, interviewed and examined the patient. The plan was developed between me and the patient. My findings and plan are as stated by the resident.    Benito Carrillo MD

## 2019-01-29 ENCOUNTER — TELEPHONE (OUTPATIENT)
Dept: GASTROENTEROLOGY | Facility: CLINIC | Age: 44
End: 2019-01-29

## 2019-01-29 NOTE — TELEPHONE ENCOUNTER
Called out to patient to confirm GI appointment for 3/15/19 at 9:20AM, received message vmail box full and unable to leave message on cell phone [phone], called home # listed in chart, [phone] left message to return call to clarify date and time.

## 2019-03-11 ENCOUNTER — TELEPHONE (OUTPATIENT)
Dept: GASTROENTEROLOGY | Facility: CLINIC | Age: 44
End: 2019-03-11

## 2019-03-11 NOTE — TELEPHONE ENCOUNTER
Called and left message for patient reminding of appointment scheduled on 3/15/19 at 0920 with Dr. Wisdom GI clinic. Patient to arrive 15 min early. If need to be reschedule, patient to call [phone].    ASTER Patricia

## 2019-03-13 NOTE — TELEPHONE ENCOUNTER
FUTURE VISIT INFORMATION      FUTURE VISIT INFORMATION:    Date: 3/15/19    Time: 9:20AM    Location: [facility]  REFERRAL INFORMATION:    Referring provider:  Jyotsna Montez CNP    Referring providers clinic:  [facility]    Reason for visit/diagnosis:  Chronic Constipation     NOTES STATUS DETAILS   OFFICE NOTE from referring provider Internal 11/26/18   OFFICE NOTE from other specialist N/A    DISCHARGE SUMMARY from hospital N/A    OPERATIVE REPORT N/A    MEDICATION LIST Internal         ENDOSCOPY  N/A    COLONOSCOPY N/A    ERCP N/A    EUS N/A    STOOL TESTING N/A    PERTINENT LABS Internal    PATHOLOGY REPORTS (RELATED) N/A    IMAGING (CT, MRI, EGD) Care Everywhere PACS

## 2019-03-15 ENCOUNTER — PRE VISIT (OUTPATIENT)
Dept: GASTROENTEROLOGY | Facility: CLINIC | Age: 44
End: 2019-03-15

## 2019-03-15 ENCOUNTER — OFFICE VISIT (OUTPATIENT)
Dept: GASTROENTEROLOGY | Facility: CLINIC | Age: 44
End: 2019-03-15
Payer: MEDICARE

## 2019-03-15 VITALS
TEMPERATURE: 97.6 F | OXYGEN SATURATION: 99 % | HEART RATE: 89 BPM | SYSTOLIC BLOOD PRESSURE: 120 MMHG | DIASTOLIC BLOOD PRESSURE: 74 MMHG

## 2019-03-15 DIAGNOSIS — K59.2 NEUROGENIC BOWEL: Primary | ICD-10-CM

## 2019-03-15 RX ORDER — SALICYLIC ACID
15-30 POWDER (GRAM) MISCELLANEOUS DAILY
Qty: 4000 ML | Refills: 3 | Status: SHIPPED | OUTPATIENT
Start: 2019-03-15

## 2019-03-15 ASSESSMENT — PAIN SCALES - GENERAL: PAINLEVEL: NO PAIN (0)

## 2019-03-15 ASSESSMENT — ENCOUNTER SYMPTOMS
RECTAL PAIN: 0
NAUSEA: 0
BLOATING: 0
ABDOMINAL PAIN: 0
DIARRHEA: 0
CONSTIPATION: 1
BOWEL INCONTINENCE: 0
BLOOD IN STOOL: 0
HEARTBURN: 0
JAUNDICE: 0
VOMITING: 0

## 2019-03-15 NOTE — NURSING NOTE
Chief Complaint   Patient presents with     New Patient       Vitals:    03/15/19 0913   BP: 120/74   Pulse: 89   Temp: 97.6  F (36.4  C)   TempSrc: Oral   SpO2: 99%       There is no height or weight on file to calculate BMI.    Disha Jimenez, CMA

## 2019-03-15 NOTE — PATIENT INSTRUCTIONS
It was a pleasure taking care of you today.  I've included a brief summary of our discussion and care plan from today's visit below.  Please review this information with your primary care provider.  _______________________________________________________________________    My recommendations are summarized as follows:  -- Continue senna  -- Castor oil for stimulant laxative  -- Trial of fiber for regulation of bowel movements -- may cause some initial bloating, but give it a two week trial. 1 teaspoon daily for 7 days; Then increase to 2  Teaspoon a day for 7 days; Then increase to 1 tablespoon daily. Psyllium, metamucil, or citrucel.    Return to GI Clinic in 4-6 weeks to review your progress.    _______________________________________________________________________    Who do I call with any questions after my visit?  Please be in touch if there are any further questions that arise following today's visit.  There are multiple ways to contact your gastroenterology care team.        During business hours, you may reach a Gastroenterology nurse at [phone].       To schedule or reschedule an appointment, please call [phone].       You can always send a secure message through [employer].  [employer] messages are answered by your nurse or doctor typically within 24 hours.  Please allow extra time on weekends and holidays.        For urgent/emergent questions after business hours, you may reach the on-call GI Fellow by contacting the [facility] at [phone].     How will I get the results of any tests ordered?    You will receive all of your results.  If you have signed up for [employer], any tests ordered at your visit will be available to you after your physician reviews them.  Typically this takes 1-2 weeks.  If there are urgent results that require a change in your care plan, your physician or nurse will call you to discuss the next steps.      What is [employer]?  [employer] is a secure way for you  to access all of your healthcare records from the [facility].  It is a web based computer program, so you can sign on to it from any location.  It also allows you to send secure messages to your care team.  I recommend signing up for [employer] access if you have not already done so and are comfortable with using a computer.      How to I schedule a follow-up visit?  If you did not schedule a follow-up visit today, please call [phone] to schedule a follow-up office visit.        Sincerely,    Chantell Wisdom MD  Gastroenterology Fellow  [facility]

## 2019-03-15 NOTE — LETTER
3/15/2019       RE: Rusty Serrato  [address] [id number]     Dear Colleague,    Thank you for referring your patient, Rusty Serrato, to the [facility] at [facility]. Please see a copy of my visit note below.    GI CLINIC VISIT  3/15/19    CC/REFERRING MD:  Jyotsna Montez  REASON FOR CONSULTATION: Constipation, neurogenic bowel    ASSESSMENT/PLAN:  Rusty Serrato is a 43 year old male with past medical history of multiple sclerosis, neurogenic bladder (self-cath 4x/d), and hyperlipidemia presenting for evaluation of chronic constipation/neurogenic bowel.     1. Neurogenic Bowel/Chronic Constipation  In setting of known neurologic disorder (multiple sclerosis). Currently taking two senna per day. Previously took Miralax in addition, which led to incontinence and significant diarrhea. Has tried suppositories with some minimal help, no prior bulking agents or enemas. Recommend trial of bulking agent as well as castor oil in addition to ongoing senna use. We discussed up-titration of fiber as well as castor oil. Will have close follow-up for monitoring and titration. We discussed CBC/iron panel given constipation for evaluation of iron deficiency anemia, however, after discussion with patient, his parents, and his wife they determined even if patient was iron deficient, patient would not want to pursue colonoscopy for evaluation of possible polyp vs mass given difficulty of bowel prep in setting of MS so they prefer to not pursue any further objective/laboratory testing.     -- Continue senna  -- Castor oil for stimulant laxative  -- Trial of fiber for regulation of bowel movements -- may cause some initial bloating, but give it a two week trial. 1 teaspoon daily for 7 days; Then increase to 2  Teaspoon a day for 7 days; Then increase to 1 tablespoon daily. Psyllium, metamucil, or citrucel.    RTC:4-6 weeks with Nithya Díaz PA-C.      Thank you for this consultation.  It was a pleasure to participate in the care of this patient; please contact us with any further questions.      Plan of care discussed with Dr. Fuentes, gastroenterology attending physician.     Chantell Wisdom MD  Gastroenterology Fellow  Division of Gastroenterology, Hepatology and Nutrition  [facility]      HPI:  Rusty Serrato is a 43 year old male with past medical history of multiple sclerosis, neurogenic bladder, and hyperlipidemia presenting for evaluation of chronic constipation/neurogenic bowel.     Patient reports chronic constipation. He reports one bowel movement a week, no melena or hematochezia. No weight loss or night sweats. He drinks lots of water. He is currently taking two senna tablets daily. Previously tried Miralax which led to explosive diarrhea and incontinence. Did not try fiber/bulking at this time. He has tried suppositories without much improvement. Has never tried enemas. He denies abdominal pain, reflux, dysphagia, odynophagia, nausea, vomiting. Otherwise feels well. Is not currently on any medications for MS given subtype.     ROS:    No fevers or chills  No weight loss  No blurry vision, double vision or change in vision  No sore throat  No lymphadenopathy  No headache, paraesthesias, or weakness in a limb  No shortness of breath or wheezing  No chest pain or pressure  No arthralgias or myalgias  No rashes or skin changes  No odynophagia or dysphagia  No BRBPR, hematochezia, melena  No dysuria, frequency or urgency  No hot/cold intolerance or polyria  No anxiety or depression    PROBLEM LIST  Patient Active Problem List    Diagnosis Date Noted     Multiple sclerosis (H) 05/23/2014     Priority: Medium     HYPERLIPIDEMIA LDL GOAL <160 06/11/2010     Priority: Medium     Hyperlipidemia 02/15/2008     Priority: Medium     Problem list name updated by automated process. Provider to review       Psychosexual dysfunction with inhibited  sexual excitement 10/17/2007     Priority: Medium       PERTINENT PAST MEDICAL HISTORY:  Multiple sclerosis  Neurogenic bladder  Hyperlipidemia    PREVIOUS SURGERIES:  Past Surgical History:   Procedure Laterality Date     NO HISTORY OF SURGERY     Reviewed.     PREVIOUS ENDOSCOPY:  No prior colonoscopies.     ALLERGIES:     Allergies   Allergen Reactions     No Clinical Screening - See Comments Hives     Sulfa Drugs      Rash - as a child  Rash - as a child  Rash - as a child   Reviewed.     PERTINENT MEDICATIONS:    Current Outpatient Medications:      Amino Acids (AMINO ACID PO), Take by mouth daily, Disp: , Rfl:      calcium citrate 250 MG TABS, Take 2 tablets by mouth daily, Disp: , Rfl:      Cholecalciferol (VITAMIN D3) 5000 UNITS TABS, Take by mouth daily, Disp: , Rfl:      Dalfampridine (AMPYRA PO), Take 5 mg by mouth 3 times daily, Disp: , Rfl:      magnesium 250 MG tablet, Take 2 tablets by mouth daily, Disp: , Rfl:      methenamine hippurate (HIPREX) 1 g TABS tablet, Take 1 g by mouth 2 times daily, Disp: , Rfl:      Multiple Vitamins-Minerals (MULTIVITAMIN OR), Take by mouth daily, Disp: , Rfl:      Omega-3 Fatty Acids (OMEGA-3 FISH OIL PO), Take by mouth daily, Disp: , Rfl:      senna-docusate (SENOKOT-S;PERICOLACE) 8.6-50 MG per tablet, Take 1 tablet by mouth 2 times daily, Disp: 60 tablet, Rfl: 11    SOCIAL HISTORY:  Social History     Socioeconomic History     Marital status:      Spouse name: Not on file     Number of children: Not on file     Years of education: Not on file     Highest education level: Not on file   Occupational History     Not on file   Social Needs     Financial resource strain: Not on file     Food insecurity:     Worry: Not on file     Inability: Not on file     Transportation needs:     Medical: Not on file     Non-medical: Not on file   Tobacco Use     Smoking status: Never Smoker     Smokeless tobacco: Never Used   Substance and Sexual Activity     Alcohol use: Yes      Comment: occasional     Drug use: No     Sexual activity: Yes     Partners: Female   Lifestyle     Physical activity:     Days per week: Not on file     Minutes per session: Not on file     Stress: Not on file   Relationships     Social connections:     Talks on phone: Not on file     Gets together: Not on file     Attends Restorationism service: Not on file     Active member of club or organization: Not on file     Attends meetings of clubs or organizations: Not on file     Relationship status: Not on file     Intimate partner violence:     Fear of current or ex partner: Not on file     Emotionally abused: Not on file     Physically abused: Not on file     Forced sexual activity: Not on file   Other Topics Concern     Not on file   Social History Narrative    Dairy/d 2 servings/d.     Caffeine 2 servings/d    Exercise 2 x week    Sunscreen used - Yes    Seatbelts used - Yes    Working smoke/CO detectors in the home - Yes    Guns stored in the home - No    Self Breast Exams - NA    Self Testicular Exam - Yes    Eye Exam up to date - Yes anually    Dental Exam up to date - Yes 2x per year    Pap Smear up to date - NA    Mammogram up to date - NA    PSA up to date - No    Dexa Scan up to date - NA    Flex Sig / Colonoscopy up to date - NA    Immunizations up to date - Yes TD 07/20/2004    Abuse: Current or Past(Physical, Sexual or Emotional)- No    Do you feel safe in your environment - Yes    SARAI Gerber MA       Non-smoker. No alcohol or recreational drug use.     FAMILY HISTORY:  Family History   Problem Relation Age of Onset     Alzheimer Disease Maternal Grandmother      Cancer Maternal Grandfather      Breast Cancer Paternal Grandmother      C.A.D. Paternal Grandfather    No family history of colon cancer.     Past/family/social history reviewed and no changes    PHYSICAL EXAMINATION:  Vitals reviewed: There were no vitals taken for this visit.  Wt:   Wt Readings from Last 2 Encounters:   12/03/18 83.9 kg (185  lb)   10/30/18 86.9 kg (191 lb 8 oz)   Constitutional: aaox3, cooperative, pleasant, not dyspneic/diaphoretic, no acute distress   Eyes: Sclera anicteric/injected  Ears/nose/mouth/throat: Normal oropharynx without ulcers or exudate, mucus membranes moist, hearing intact  Neck: supple, thyroid normal size  CV: No edema  Respiratory: Unlabored breathing  Lymph: No axillary, submandibular, supraclavicular lymphadenopathy  Abd: Nondistended, +bs, no hepatosplenomegaly, nontender, no peritoneal signs  Skin: warm, perfused, no jaundice  Psych: Normal affect  MSK: Wheelchair bound    PERTINENT STUDIES:  Most recent CBC:  No lab results found.  Most recent hepatic panel:  No lab results found.    Invalid input(s): SAL, ALP  Most recent creatinine:  Recent Labs   Lab Test 12/03/18  1320 04/17/18  1159   CR 1.02 0.98       I performed a history and physical examination of the above patient and discussed the management with Dr. Wisdom on 3/15/2019. I reviewed the note and there are no changes to the past medical, family or social history.  A complete 10 point review of systems was obtained. Please see the HPI for pertinent positives and negatives. All other systems were reviewed and were found to be negative. I agree with the documented findings and plan of care as outlined.    Daniel Fuentes MD  GI Attending  Pager: [phone]

## 2019-03-15 NOTE — PROGRESS NOTES
I performed a history and physical examination of the above patient and discussed the management with Dr. Wisdom on 3/15/2019. I reviewed the note and there are no changes to the past medical, family or social history.  A complete 10 point review of systems was obtained. Please see the HPI for pertinent positives and negatives. All other systems were reviewed and were found to be negative. I agree with the documented findings and plan of care as outlined.    Daniel Fuentes MD  GI Attending  Pager: [phone]

## 2019-03-15 NOTE — PROGRESS NOTES
GI CLINIC VISIT  3/15/19    CC/REFERRING MD:  Jyotsna Montez  REASON FOR CONSULTATION: Constipation, neurogenic bowel    ASSESSMENT/PLAN:  Rusty Serrato is a 43 year old male with past medical history of multiple sclerosis, neurogenic bladder (self-cath 4x/d), and hyperlipidemia presenting for evaluation of chronic constipation/neurogenic bowel.     1. Neurogenic Bowel/Chronic Constipation  In setting of known neurologic disorder (multiple sclerosis). Currently taking two senna per day. Previously took Miralax in addition, which led to incontinence and significant diarrhea. Has tried suppositories with some minimal help, no prior bulking agents or enemas. Recommend trial of bulking agent as well as castor oil in addition to ongoing senna use. We discussed up-titration of fiber as well as castor oil. Will have close follow-up for monitoring and titration. We discussed CBC/iron panel given constipation for evaluation of iron deficiency anemia, however, after discussion with patient, his parents, and his wife they determined even if patient was iron deficient, patient would not want to pursue colonoscopy for evaluation of possible polyp vs mass given difficulty of bowel prep in setting of MS so they prefer to not pursue any further objective/laboratory testing.     -- Continue senna  -- Castor oil for stimulant laxative  -- Trial of fiber for regulation of bowel movements -- may cause some initial bloating, but give it a two week trial. 1 teaspoon daily for 7 days; Then increase to 2  Teaspoon a day for 7 days; Then increase to 1 tablespoon daily. Psyllium, metamucil, or citrucel.    RTC:4-6 weeks with Nithya Díaz PA-C.     Thank you for this consultation.  It was a pleasure to participate in the care of this patient; please contact us with any further questions.      Plan of care discussed with Dr. Fuentes, gastroenterology attending physician.     Chantell Wisdom MD  Gastroenterology Fellow  Division of  Gastroenterology, Hepatology and Nutrition  [facility]    HPI:  Rusty Serrato is a 43 year old male with past medical history of multiple sclerosis, neurogenic bladder, and hyperlipidemia presenting for evaluation of chronic constipation/neurogenic bowel.     Patient reports chronic constipation. He reports one bowel movement a week, no melena or hematochezia. No weight loss or night sweats. He drinks lots of water. He is currently taking two senna tablets daily. Previously tried Miralax which led to explosive diarrhea and incontinence. Did not try fiber/bulking at this time. He has tried suppositories without much improvement. Has never tried enemas. He denies abdominal pain, reflux, dysphagia, odynophagia, nausea, vomiting. Otherwise feels well. Is not currently on any medications for MS given subtype.     ROS:    No fevers or chills  No weight loss  No blurry vision, double vision or change in vision  No sore throat  No lymphadenopathy  No headache, paraesthesias, or weakness in a limb  No shortness of breath or wheezing  No chest pain or pressure  No arthralgias or myalgias  No rashes or skin changes  No odynophagia or dysphagia  No BRBPR, hematochezia, melena  No dysuria, frequency or urgency  No hot/cold intolerance or polyria  No anxiety or depression    PROBLEM LIST  Patient Active Problem List    Diagnosis Date Noted     Multiple sclerosis (H) 05/23/2014     Priority: Medium     HYPERLIPIDEMIA LDL GOAL <160 06/11/2010     Priority: Medium     Hyperlipidemia 02/15/2008     Priority: Medium     Problem list name updated by automated process. Provider to review       Psychosexual dysfunction with inhibited sexual excitement 10/17/2007     Priority: Medium       PERTINENT PAST MEDICAL HISTORY:  Multiple sclerosis  Neurogenic bladder  Hyperlipidemia    PREVIOUS SURGERIES:  Past Surgical History:   Procedure Laterality Date     NO HISTORY OF SURGERY     Reviewed.     PREVIOUS ENDOSCOPY:  No prior  colonoscopies.     ALLERGIES:     Allergies   Allergen Reactions     No Clinical Screening - See Comments Hives     Sulfa Drugs      Rash - as a child  Rash - as a child  Rash - as a child   Reviewed.     PERTINENT MEDICATIONS:    Current Outpatient Medications:      Amino Acids (AMINO ACID PO), Take by mouth daily, Disp: , Rfl:      calcium citrate 250 MG TABS, Take 2 tablets by mouth daily, Disp: , Rfl:      Cholecalciferol (VITAMIN D3) 5000 UNITS TABS, Take by mouth daily, Disp: , Rfl:      Dalfampridine (AMPYRA PO), Take 5 mg by mouth 3 times daily, Disp: , Rfl:      magnesium 250 MG tablet, Take 2 tablets by mouth daily, Disp: , Rfl:      methenamine hippurate (HIPREX) 1 g TABS tablet, Take 1 g by mouth 2 times daily, Disp: , Rfl:      Multiple Vitamins-Minerals (MULTIVITAMIN OR), Take by mouth daily, Disp: , Rfl:      Omega-3 Fatty Acids (OMEGA-3 FISH OIL PO), Take by mouth daily, Disp: , Rfl:      senna-docusate (SENOKOT-S;PERICOLACE) 8.6-50 MG per tablet, Take 1 tablet by mouth 2 times daily, Disp: 60 tablet, Rfl: 11    SOCIAL HISTORY:  Social History     Socioeconomic History     Marital status:      Spouse name: Not on file     Number of children: Not on file     Years of education: Not on file     Highest education level: Not on file   Occupational History     Not on file   Social Needs     Financial resource strain: Not on file     Food insecurity:     Worry: Not on file     Inability: Not on file     Transportation needs:     Medical: Not on file     Non-medical: Not on file   Tobacco Use     Smoking status: Never Smoker     Smokeless tobacco: Never Used   Substance and Sexual Activity     Alcohol use: Yes     Comment: occasional     Drug use: No     Sexual activity: Yes     Partners: Female   Lifestyle     Physical activity:     Days per week: Not on file     Minutes per session: Not on file     Stress: Not on file   Relationships     Social connections:     Talks on phone: Not on file     Gets  together: Not on file     Attends [employer] service: Not on file     Active member of club or organization: Not on file     Attends meetings of clubs or organizations: Not on file     Relationship status: Not on file     Intimate partner violence:     Fear of current or ex partner: Not on file     Emotionally abused: Not on file     Physically abused: Not on file     Forced sexual activity: Not on file   Other Topics Concern     Not on file   Social History Narrative    Dairy/d 2 servings/d.     Caffeine 2 servings/d    Exercise 2 x week    Sunscreen used - Yes    Seatbelts used - Yes    Working smoke/CO detectors in the home - Yes    Guns stored in the home - No    Self Breast Exams - NA    Self Testicular Exam - Yes    Eye Exam up to date - Yes anually    Dental Exam up to date - Yes 2x per year    Pap Smear up to date - NA    Mammogram up to date - NA    PSA up to date - No    Dexa Scan up to date - NA    Flex Sig / Colonoscopy up to date - NA    Immunizations up to date - Yes TD 07/20/2004    Abuse: Current or Past(Physical, Sexual or Emotional)- No    Do you feel safe in your environment - Yes    SARAI Gerber MA       Non-smoker. No alcohol or recreational drug use.     FAMILY HISTORY:  Family History   Problem Relation Age of Onset     Alzheimer Disease Maternal Grandmother      Cancer Maternal Grandfather      Breast Cancer Paternal Grandmother      C.A.D. Paternal Grandfather    No family history of colon cancer.     Past/family/social history reviewed and no changes    PHYSICAL EXAMINATION:  Vitals reviewed: There were no vitals taken for this visit.  Wt:   Wt Readings from Last 2 Encounters:   12/03/18 83.9 kg (185 lb)   10/30/18 86.9 kg (191 lb 8 oz)   Constitutional: aaox3, cooperative, pleasant, not dyspneic/diaphoretic, no acute distress   Eyes: Sclera anicteric/injected  Ears/nose/mouth/throat: Normal oropharynx without ulcers or exudate, mucus membranes moist, hearing intact  Neck: supple, thyroid  normal size  CV: No edema  Respiratory: Unlabored breathing  Lymph: No axillary, submandibular, supraclavicular lymphadenopathy  Abd: Nondistended, +bs, no hepatosplenomegaly, nontender, no peritoneal signs  Skin: warm, perfused, no jaundice  Psych: Normal affect  MSK: Wheelchair bound    PERTINENT STUDIES:  Most recent CBC:  No lab results found.  Most recent hepatic panel:  No lab results found.    Invalid input(s): SAL, ALP  Most recent creatinine:  Recent Labs   Lab Test 12/03/18  1320 04/17/18  1159   CR 1.02 0.98

## 2019-04-19 ENCOUNTER — TELEPHONE (OUTPATIENT)
Dept: GASTROENTEROLOGY | Facility: CLINIC | Age: 44
End: 2019-04-19

## 2019-04-19 NOTE — TELEPHONE ENCOUNTER
Pts mom, Iris, called as pt has appt with Nithya Díaz on 4/30/19; however, she was wondering if this appt can be changed to a phone appt, as it is hard to get Rusty into the clinic.  Iris stated that there hasn't been a lot of progress and feels that this appt can be done by phone.  Please follow up with Iris at [phone].  Thank you!

## 2019-04-19 NOTE — TELEPHONE ENCOUNTER
Patient's mother Iris called in response to her phone call. Rusty is currently having two bowel movements a week which can be very explosive and urgent. Consistency is soft with some form. In between bowel movements, he does not report significant pain. Current bowel regimen is 1 tablespoon of metamucil, 1 teaspoon of castor oil, and 2 tablets of senna twice daily.     We discussed that we could improve stool consistency by increasing fiber supplementation slowly. Could also consider decreasing castor oil vs. Switching to bisacodyl (5 mg up to 15 mg daily) as substitution for a stimulant laxative. Patient's mother would like to avoid enemas/suppositories so that the patient can have independence with having a bowel movement. Other considerations include Linzess.    Iris will discuss with Rusty's wife Shelbi and call with an update next week.     Tentative plan:  -- increase fiber supplementation to 1 and 1/3 tablespoon daily   -- continue senna 2 tablets BID  -- continue castor oil (could decrease to 2/3 teaspoon) vs. Consider bisacodyl PO  -- if 5-6 days without bowel movement, take 1 capful of Miralax until he has a bowel movement   -- can push back clinic visit, will contact our  to arrange follow-up    Nithya Díaz PA-C  Division of Gastroenterology, Hepatology & Nutrition  [facility]

## 2019-04-25 ENCOUNTER — OFFICE VISIT (OUTPATIENT)
Dept: OPHTHALMOLOGY | Facility: CLINIC | Age: 44
End: 2019-04-25
Attending: OPHTHALMOLOGY
Payer: MEDICARE

## 2019-04-25 ENCOUNTER — TELEPHONE (OUTPATIENT)
Dept: OPHTHALMOLOGY | Facility: CLINIC | Age: 44
End: 2019-04-25

## 2019-04-25 DIAGNOSIS — H47.20 OPTIC ATROPHY: ICD-10-CM

## 2019-04-25 DIAGNOSIS — G35 MULTIPLE SCLEROSIS (H): Primary | ICD-10-CM

## 2019-04-25 DIAGNOSIS — H47.20 OPTIC ATROPHY: Primary | ICD-10-CM

## 2019-04-25 PROCEDURE — 92133 CPTRZD OPH DX IMG PST SGM ON: CPT | Mod: ZF | Performed by: OPHTHALMOLOGY

## 2019-04-25 PROCEDURE — 92083 EXTENDED VISUAL FIELD XM: CPT | Mod: ZF | Performed by: OPHTHALMOLOGY

## 2019-04-25 PROCEDURE — 92015 DETERMINE REFRACTIVE STATE: CPT | Mod: GY,ZF

## 2019-04-25 PROCEDURE — G0463 HOSPITAL OUTPT CLINIC VISIT: HCPCS | Mod: 25,ZF

## 2019-04-25 ASSESSMENT — VISUAL ACUITY
OS_PH_CC+: +2
OD_CC: 20/40
OS_CC: 20/40
METHOD: SNELLEN - LINEAR
OS_PH_CC: 20/25
OD_CC+: -1
OD_PH_CC: 20/30
OD_PH_CC+: +2

## 2019-04-25 ASSESSMENT — REFRACTION_WEARINGRX
OS_SPHERE: -4.75
OD_SPHERE: -5.25
SPECS_TYPE: SVL
OS_AXIS: 100
OS_CYLINDER: +0.50
OD_AXIS: 070
OD_CYLINDER: +0.75

## 2019-04-25 ASSESSMENT — CONF VISUAL FIELD
METHOD: COUNTING FINGERS
OS_NORMAL: 1
OD_NORMAL: 1

## 2019-04-25 ASSESSMENT — REFRACTION_MANIFEST
OS_CYLINDER: +0.25
OD_AXIS: 047
OD_CYLINDER: +0.50
OS_ADD: +1.00
OS_AXIS: 108
OD_SPHERE: -5.75
OS_SPHERE: -5.75
OD_ADD: +1.00

## 2019-04-25 ASSESSMENT — EXTERNAL EXAM - LEFT EYE: OS_EXAM: NORMAL

## 2019-04-25 ASSESSMENT — SLIT LAMP EXAM - LIDS
COMMENTS: NORMAL
COMMENTS: NORMAL

## 2019-04-25 ASSESSMENT — TONOMETRY
IOP_METHOD: TONOPEN
OD_IOP_MMHG: 14
OS_IOP_MMHG: 14

## 2019-04-25 ASSESSMENT — CUP TO DISC RATIO
OD_RATIO: 0.2
OS_RATIO: 0.2

## 2019-04-25 ASSESSMENT — EXTERNAL EXAM - RIGHT EYE: OD_EXAM: NORMAL

## 2019-04-25 NOTE — LETTER
2019         RE:  :  MRN: Rusty QUEEN May  1975  [id number]     Dear Dr. Barry:     Your patient, Rusty QUEEN May, returned for neuro-ophthalmic follow up. My assessment and plan are below.  For further details, please see my attached clinic note.      Assessment & Plan   Rusty Serrato is a 44 year old male with the following diagnoses:   1. Multiple sclerosis (H)    2. Optic atrophy      1 year Follow-up optic atrophy.  He has a history of primary progressive multiple sclerosis. Last seen by Dr. Montez 2018. Per last note, his exam was stable. Ocrelizumab was recommended but he would like to hold off. He has a f/u with Dr. Barry on 19.       He denies any new changes with his vision.  On exam his visual acuity is  right eye 20/20 left eye.  His visual fields has worse mean deviation but overall stable. His RNFL OCT is stable both eyes. He does appear to have new left JAIDEN today.      Overall, his visual acuity and RNFL OCT are stable, however, he appears to have new left JAIDEN today (slowed adduction left eye).  He is currently not on any MS medications at this point. Follow up 1 year sooner as needed for worsening symptoms.  New prescription given for glasses today.     Again, thank you for allowing me to participate in the care of your patient.      Sincerely,    Bear Zurita MD  Professor  Ophthalmology Residency   Director of Neuro-Ophthalmology  [employer] [facility] [employer] Endowed Chair  Departments of Ophthalmology, Neurology, and Neurosurgery  [facility] [address]  [address], [place]  [address]  T - [phone]  F - [phone]  [email or web address]      CC: Benito Carrillo MD  [address] [facility] [address]  [facility] [address]  VIA In Basket     Adelia Lagunas, RN  VIA In Basket     Dinora Larry RN  VIA In Basket     Simone Bustillo MD  [facility]  [address] [address]  VIA Facsimile: [phone]     Dylan Rubalcava  MD Jhonny  [id number]  [facility] [address]  VIA In Basket

## 2019-04-25 NOTE — PROGRESS NOTES
Assessment & Plan     Rusty Serrato is a 44 year old male with the following diagnoses:   1. Multiple sclerosis (H)    2. Optic atrophy          1 year Follow-up optic atrophy.  He has a history of primary progressive multiple sclerosis. Last seen by Dr. Montez 11/2018. Per last note, his exam was stable. Ocrelizumab was recommended but he would like to hold off. He has a f/u with Dr. Barry on 4/30/19.       He denies any new changes with his vision.  On exam his visual acuity is 2025 right eye 20/20 left eye.  His visual fields has worse mean deviation but overall stable. His RNFL OCT is stable both eyes. He does appear to have new left JAIDEN today.      Overall, his visual acuity and RNFL OCT are stable, however, he appears to have new left JAIDEN today (slowed adduction left eye).  He is currently not on any MS medications at this point. Follow up 1 year sooner as needed for worsening symptoms.  New prescription given for glasses today.          Attending Physician Attestation:  Complete documentation of historical and exam elements from today's encounter can be found in the full encounter summary report (not reduplicated in this progress note).  I personally obtained the chief complaint(s) and history of present illness.  I confirmed and edited as necessary the review of systems, past medical/surgical history, family history, social history, and examination findings as documented by others; and I examined the patient myself.  I personally reviewed the relevant tests, images, and reports as documented above.  I formulated and edited as necessary the assessment and plan and discussed the findings and management plan with the patient and family. - Bear Zurita MD

## 2019-04-25 NOTE — TELEPHONE ENCOUNTER
Note to  for assistance mailing glasses Rx/AVS  Shane Stein RN 2:00 PM 04/25/19        [facility] Call Center    Phone Message    May a detailed message be left on voicemail: yes    Reason for Call: Other: Rusty attending this morning appointment with his mother.  They had to leave without getting the prescription or after visit summary due to Rusty's condition (MS).  Please mail a copy of his eye glass prescription and after visit summary to [address] (home address)    Action Taken: Message routed to:  [facility] becca opt

## 2019-04-30 ENCOUNTER — OFFICE VISIT (OUTPATIENT)
Dept: NEUROLOGY | Facility: CLINIC | Age: 44
End: 2019-04-30
Attending: PSYCHIATRY & NEUROLOGY
Payer: MEDICARE

## 2019-04-30 ENCOUNTER — TELEPHONE (OUTPATIENT)
Dept: NEUROLOGY | Facility: CLINIC | Age: 44
End: 2019-04-30

## 2019-04-30 VITALS
HEART RATE: 81 BPM | DIASTOLIC BLOOD PRESSURE: 83 MMHG | HEIGHT: 75 IN | BODY MASS INDEX: 23.12 KG/M2 | SYSTOLIC BLOOD PRESSURE: 128 MMHG

## 2019-04-30 DIAGNOSIS — G35 MULTIPLE SCLEROSIS (H): Primary | ICD-10-CM

## 2019-04-30 RX ORDER — AMANTADINE HYDROCHLORIDE 100 MG/1
100 CAPSULE, GELATIN COATED ORAL 2 TIMES DAILY
Qty: 60 CAPSULE | Refills: 5 | Status: SHIPPED | OUTPATIENT
Start: 2019-04-30 | End: 2019-08-29

## 2019-04-30 ASSESSMENT — PAIN SCALES - GENERAL: PAINLEVEL: NO PAIN (0)

## 2019-04-30 NOTE — LETTER
"4/30/2019      RE: Rusty QUEEN May  [address] [address]       Service Date: 04/30/2019      HISTORY OF PRESENT ILLNESS:  Rusty Serrato is a 44-year-old man who I follow for primary progressive multiple sclerosis.  He returns for regular followup.  He was last seen in clinic by Jyotsna Montez in 11/2018.      HISTORY OF PRESENT ILLNESS:  Rusty is accompanied by his mother (I believe), and his wife joins the conversation by phone.  His family reports that the last 6 months have been bad for him, with the left leg functioning worse and him going from \"pretty independent to wheelchair bound.\"  He apparently was hospitalized again in December for urosepsis, following a similar hospitalization in 10/2018.  He was then discharged to transitional care, but they tell me he was discharged at the end of January because he was \"not progressing.\"  He has had quite a bit of trouble with the bowels.  Constipation was a major issue and his gastroenterologist has him on a bowel program including castor oil, Metamucil and MiraLax.  The main problem now is bowel incontinence.  He apparently does not have much warning and has had multiple accidents.  He has a Caraballo catheter at this point.  We had previously discussed treating him with ocrelizumab and actually prescribed that, but they ended up not going through with that.  I believe there were some issues with out-of-pocket expenses.      PHYSICAL EXAMINATION:   VITAL SIGNS:  Blood pressure 128/83.  Pulse 81.  Respiratory rate 12.   NEUROLOGIC EXAM:  He is alert and oriented.  Affect is neutral.  Cranial nerves are notable for bilateral partial JAIDEN.  Muscle bulk and tone are normal, and strength is surprisingly intact given his severe walking impairment.  Hip flexion and knee flexion were both somewhat weak on the left graded 4/5, otherwise strength was normal.  Finger tapping was slow and clumsy on the left.  I did not attempt to have him walk today as no walker was " readily available.      IMPRESSION:  Primary progressive multiple sclerosis, with reported stepwise decline in function after yet another urinary tract infection requiring hospitalization.      I spent 36 minutes with Rusty and his family, greater than 50% of which was spent in counseling and coordination of care.  We discussed how stepwise decline in function can often accompany significant medical events such as severe infections, surgeries, etc., in patients with advanced multiple sclerosis.  They were quite concerned about the bowel incontinence and what to do about that.  I ultimately told them I suggest they continue to work with Gastroenterology, perhaps to find a bowel program where they would have a bit more control over the timing of bowel movements, perhaps with intermittent laxative suppositories, or osmotic agents such as the miralax.  We mainly discussed disease-modifying treatment.  I reviewed the data on ocrelizumab.  It showed a modest decrease in the rate of disease progression.  I told them I generally recommended it in people who were still clearly actively progressing, and I would include Rusty in that group.  They would like to go ahead with that.  We also discussed pulsed steroids as an alternative.     PLAN:   1. We will restart the prescription process for ocrelizumab.   2. Amantadine 100 mg p.o. b.i.d. for fatigue.   3. Return to clinic in 6 months.      DEMETRIS JONES MD       D: 2019   T: 2019   MT: annel      Name:     RUSTY WHEELER   MRN:      [id number]        Account:      [id number]   :      1975           Service Date: 2019      Document: [id number]

## 2019-04-30 NOTE — TELEPHONE ENCOUNTER
Patient was in for an office visit today and asked about starting on Ocrevus infusions; This was attempted about a year ago, but for financial reasons, the patient could not afford the infusion; Patient asked about getting the infusion, which Dr. Barry is okay with us starting the process again (need to start from scratch); Ocrevus infusion therapy plan orders placed and routed to Dr. Barry for review and signature; Will submit PA request once the orders have been signed.    Dinora Larry, MS RN Care Coordinator

## 2019-05-01 RX ORDER — ACETAMINOPHEN 325 MG/1
650 TABLET ORAL ONCE
Status: CANCELLED | OUTPATIENT
Start: 2019-05-01

## 2019-05-01 RX ORDER — DIPHENHYDRAMINE HCL 25 MG
50 CAPSULE ORAL ONCE
Status: CANCELLED | OUTPATIENT
Start: 2019-05-01

## 2019-05-01 RX ORDER — HEPARIN SODIUM,PORCINE 10 UNIT/ML
5 VIAL (ML) INTRAVENOUS
Status: CANCELLED | OUTPATIENT
Start: 2019-05-01

## 2019-05-01 RX ORDER — METHYLPREDNISOLONE SODIUM SUCCINATE 125 MG/2ML
125 INJECTION, POWDER, LYOPHILIZED, FOR SOLUTION INTRAMUSCULAR; INTRAVENOUS ONCE
Status: CANCELLED | OUTPATIENT
Start: 2019-05-01

## 2019-05-01 RX ORDER — HEPARIN SODIUM (PORCINE) LOCK FLUSH IV SOLN 100 UNIT/ML 100 UNIT/ML
5 SOLUTION INTRAVENOUS
Status: CANCELLED | OUTPATIENT
Start: 2019-05-01

## 2019-05-01 NOTE — TELEPHONE ENCOUNTER
Called patient's mother, Lizeth and left [facility] to let her know that we will be submitting PA shortly and that it can take up to 2 weeks to get a determination from insurance. We will be in touch when more info is available.

## 2019-05-01 NOTE — TELEPHONE ENCOUNTER
[facility] Call Center    Phone Message    May a detailed message be left on voicemail: yes    Reason for Call: Other: Pt's mother calling to speak with Dinora to see if there is anything they need to do on their end for the Ocrevus Infusions and getting those approved. Please give Lizeth a call back at [phone] to discuss.      Action Taken: Message routed to:  [facility] Neurology

## 2019-05-02 ENCOUNTER — TELEPHONE (OUTPATIENT)
Dept: GASTROENTEROLOGY | Facility: CLINIC | Age: 44
End: 2019-05-02

## 2019-05-02 ENCOUNTER — TELEPHONE (OUTPATIENT)
Dept: NEUROLOGY | Facility: CLINIC | Age: 44
End: 2019-05-02

## 2019-05-02 NOTE — TELEPHONE ENCOUNTER
Patient's mother Iris was called at ([phone]) in response to message she left with Jen Akmargymartine. She was instructed to call [phone] and let me know when would be a good time to call her back (Friday or next week).     Nithya Díaz PA-C  Division of Gastroenterology, Hepatology & Nutrition  [facility]

## 2019-05-02 NOTE — TELEPHONE ENCOUNTER
Prior Authorization Infusion/Clinic Administered Request    Location: [facility]   Diagnosis and ICD: Primary Progressive Multiple Sclerosis, G35  Drug/Therapy: Ocrevus 300mg IV day 1 and day 15, then Ocrevus 600mg IV every 6 months thereafter    Previously Tried and Failed Therapies: None, there are no other FDA approved medications for PPMS, please approve.    Date of provider note with supporting information: 4/30/19    Urgency (When is the patient scheduled?): ASAP please    Would you like to include any research articles? n/a        If yes please call [phone] for further instructions about sending that information

## 2019-05-02 NOTE — PROGRESS NOTES
"Service Date: 04/30/2019      HISTORY OF PRESENT ILLNESS:  Rusty Serrato is a 44-year-old man who I follow for primary progressive multiple sclerosis.  He returns for regular followup.  He was last seen in clinic by Jyotsna Montez in 11/2018.      HISTORY OF PRESENT ILLNESS:  Rusty is accompanied by his mother (I believe), and his wife joins the conversation by phone.  His family reports that the last 6 months have been bad for him, with the left leg functioning worse and him going from \"pretty independent to wheelchair bound.\"  He apparently was hospitalized again in December for urosepsis, following a similar hospitalization in 10/2018.  He was then discharged to transitional care, but they tell me he was discharged at the end of January because he was \"not progressing.\"  He has had quite a bit of trouble with the bowels.  Constipation was a major issue and his gastroenterologist has him on a bowel program including castor oil, Metamucil and MiraLax.  The main problem now is bowel incontinence.  He apparently does not have much warning and has had multiple accidents.  He has a Caraballo catheter at this point.  We had previously discussed treating him with ocrelizumab and actually prescribed that, but they ended up not going through with that.  I believe there were some issues with out-of-pocket expenses.      PHYSICAL EXAMINATION:   VITAL SIGNS:  Blood pressure 128/83.  Pulse 81.  Respiratory rate 12.   NEUROLOGIC EXAM:  He is alert and oriented.  Affect is neutral.  Cranial nerves are notable for bilateral partial JAIDEN.  Muscle bulk and tone are normal, and strength is surprisingly intact given his severe walking impairment.  Hip flexion and knee flexion were both somewhat weak on the left graded 4/5, otherwise strength was normal.  Finger tapping was slow and clumsy on the left.  I did not attempt to have him walk today as no walker was readily available.      IMPRESSION:  Primary progressive multiple sclerosis, with " reported stepwise decline in function after yet another urinary tract infection requiring hospitalization.      I spent 36 minutes with Rusty and his family, greater than 50% of which was spent in counseling and coordination of care.  We discussed how stepwise decline in function can often accompany significant medical events such as severe infections, surgeries, etc., in patients with advanced multiple sclerosis.  They were quite concerned about the bowel incontinence and what to do about that.  I ultimately told them I suggest they continue to work with Gastroenterology, perhaps to find a bowel program where they would have a bit more control over the timing of bowel movements, perhaps with intermittent laxative suppositories, or osmotic agents such as the miralax.  We mainly discussed disease-modifying treatment.  I reviewed the data on ocrelizumab.  It showed a modest decrease in the rate of disease progression.  I told them I generally recommended it in people who were still clearly actively progressing, and I would include Rusty in that group.  They would like to go ahead with that.  We also discussed pulsed steroids as an alternative.     PLAN:   1. We will restart the prescription process for ocrelizumab.   2. Amantadine 100 mg p.o. b.i.d. for fatigue.   3. Return to clinic in 6 months.         DEMETRIS JONES MD             D: 2019   T: 2019   MT: annel      Name:     RUSTY WHEELER   MRN:      [id number]        Account:      [id number]   :      1975           Service Date: 2019      Document: [id number]

## 2019-05-02 NOTE — TELEPHONE ENCOUNTER
Ocrevus therapy plan orders signed by Dr. Barry; PA request sent over to infusion finance team, which can be submitted once the office visit note from Dr. Barry is signed.    Dinora Larry MS RN Care Coordinator

## 2019-05-08 ENCOUNTER — TELEPHONE (OUTPATIENT)
Dept: GASTROENTEROLOGY | Facility: CLINIC | Age: 44
End: 2019-05-08

## 2019-05-08 NOTE — TELEPHONE ENCOUNTER
[facility] Call Center    Phone Message    May a detailed message be left on voicemail: yes    Reason for Call: Other: pt mother called to establish a bowel program for pt.      Action Taken: Message routed to:  [facility] GI

## 2019-05-09 NOTE — TELEPHONE ENCOUNTER
Patient has Medicare which will cover him at 80% and then he has a medicare supplement that will  the remaining 20% so he should be coverage at 100%.    Thank you.

## 2019-05-10 NOTE — TELEPHONE ENCOUNTER
Called patient's number and left a message instructing her to call the clinic with a time she is available to talk. Can also send Enlikenhart message with times she is available next week Monday-Wednesday.

## 2019-05-10 NOTE — TELEPHONE ENCOUNTER
Insurance called with additional questions in regards to the PA request for Ocrevus; call transferred to [facility] [phone].

## 2019-05-10 NOTE — TELEPHONE ENCOUNTER
Called and spoke with the patient's mother. On average the patient is having a bowel movement 1-2 times a week. Consistency is formed but not hard, can be very large amount. Does not have a warning before bowel movements. He is on the following regimen:     -- increase fiber supplementation to 1 and 1/3 tablespoon daily   -- continue senna 2 tablets BID  -- continue castor oil (could decrease to 2/3 teaspoon) vs. Consider bisacodyl PO  -- if 5-6 days without bowel movement, take 1 capful of Miralax until he has a bowel movement     We discussed they could try scheduled toileting with sitting on the toilet after eating a meal. Could start by doing this three times a week after breakfast. Other considerations include using fleet enemas.    Nithya Díaz PA-C  Division of Gastroenterology, Hepatology & Nutrition  [facility]

## 2019-05-14 NOTE — TELEPHONE ENCOUNTER
Patient is cleared to schedule per our infusion finance team; I called the patient's mom, Lizeth, and let her know this; She had more detailed questions about the financial aspect that I cannot answer; I told Lizeth that I will ask the infusion finance team to call Mary (patient's wife), and if unable to reach, then call her.    Dinora Larry, MS RN Care Coordinator

## 2019-05-14 NOTE — TELEPHONE ENCOUNTER
Hello,    I spoke with patient's mother Iris. We went over patient's insurance coverage. I tried to help her better understand the insurance process.

## 2019-05-15 ENCOUNTER — TELEPHONE (OUTPATIENT)
Dept: GASTROENTEROLOGY | Facility: CLINIC | Age: 44
End: 2019-05-15

## 2019-05-15 NOTE — TELEPHONE ENCOUNTER
Called and spoke with the patient's mother. Plan to observe time when incontinence happens, as well as time that medications are administered to ensure optimal medication timing. Also will continue with scheduled toiliting.     If continued symptoms, would recommend enemas. Patient's mother will call with update in symptoms.     Nithya Díaz PA-C  Division of Gastroenterology, Hepatology & Nutrition  [facility]

## 2019-05-16 NOTE — TELEPHONE ENCOUNTER
The insurance called to get additional questions answered in regards to the PA request for Ocrevus. We attempted twice to transfer the call to [facility] but it gets disconnected before the transfer was complete. If [facility] calls again, they can call the infusion team directly at [phone].  The infusion team did not receive any information in regards to this request because the encounter was created and then closed.

## 2019-05-16 NOTE — TELEPHONE ENCOUNTER
Patient is scheduled for his Ocrevus infusion on 6/4/19.    Dinora Larry, MS RN Care Coordinator

## 2019-05-17 ENCOUNTER — TELEPHONE (OUTPATIENT)
Dept: OPHTHALMOLOGY | Facility: CLINIC | Age: 44
End: 2019-05-17

## 2019-05-17 NOTE — TELEPHONE ENCOUNTER
Health Call Center    Phone Message    May a detailed message be left on voicemail: yes    Reason for Call: Other: They are trying to order glasses online and have questions regarding his prescription from Dr. Zurita, specifically regarding add +1. Please call to discuss.      Action Taken: Message routed to:  [facility] Eye Clinic

## 2019-05-23 ENCOUNTER — TELEPHONE (OUTPATIENT)
Dept: NEUROLOGY | Facility: CLINIC | Age: 44
End: 2019-05-23

## 2019-05-23 DIAGNOSIS — G35 MULTIPLE SCLEROSIS (H): Primary | ICD-10-CM

## 2019-05-23 NOTE — TELEPHONE ENCOUNTER
He would probably benefit from both.   He wants to go to [facility], and I believe they have an occupational therapist on staff (at least, used to).

## 2019-05-23 NOTE — TELEPHONE ENCOUNTER
Dr. Barry, please see message from the call center; Do you think the patient would benefit from just PT or also OT? Thank you.    Dinora Larry, MS RN Care Coordinator

## 2019-05-23 NOTE — TELEPHONE ENCOUNTER
Health Call Center    Phone Message    May a detailed message be left on voicemail: yes    Reason for Call: Other: Pt's mother calling to request a referral from Dr. Barry for pt for [facility]. Please give pt's mother a call back once that has been entered into the system.     Action Taken: Message routed to:  [facility] Neurology

## 2019-05-28 NOTE — TELEPHONE ENCOUNTER
"PT and OT orders placed for [facility] (phone [phone] fax [phone]) per Dr. Barry; I called the patient's mom to let her know this; Additionally, Lizeth and Mary had questions about the Ocrevus; Our infusion finance team says that the patient is cleared to infusion (and he is currently scheduled), however, the patient received a letter stating that the Ocrevus was denied and isn't covered; I will ask someone from our infusion finance team to call the patient and investigate; Mary stated that they have been under the impression that Rusty has SPMS, and Ocrevus is approved for RRMS and PPMS (which the latter is what Dr. Barry classifies the patient as), but at the last office visit with Dr. Barry, he was stating that they are starting to classify differently to \"progressive MS\", rather than PPMS; I talked with Dr. 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    Dinora Larry, MS RN Care Coordinator    "

## 2019-06-04 ENCOUNTER — INFUSION THERAPY VISIT (OUTPATIENT)
Dept: INFUSION THERAPY | Facility: CLINIC | Age: 44
End: 2019-06-04
Attending: INTERNAL MEDICINE
Payer: MEDICARE

## 2019-06-04 VITALS
HEART RATE: 90 BPM | RESPIRATION RATE: 16 BRPM | SYSTOLIC BLOOD PRESSURE: 120 MMHG | OXYGEN SATURATION: 96 % | DIASTOLIC BLOOD PRESSURE: 76 MMHG | TEMPERATURE: 98.1 F

## 2019-06-04 DIAGNOSIS — G35 MULTIPLE SCLEROSIS (H): Primary | ICD-10-CM

## 2019-06-04 PROCEDURE — 96375 TX/PRO/DX INJ NEW DRUG ADDON: CPT

## 2019-06-04 PROCEDURE — 25000132 ZZH RX MED GY IP 250 OP 250 PS 637: Mod: ZF,GY | Performed by: PSYCHIATRY & NEUROLOGY

## 2019-06-04 PROCEDURE — 96365 THER/PROPH/DIAG IV INF INIT: CPT

## 2019-06-04 PROCEDURE — A9270 NON-COVERED ITEM OR SERVICE: HCPCS | Mod: ZF | Performed by: PSYCHIATRY & NEUROLOGY

## 2019-06-04 PROCEDURE — 96366 THER/PROPH/DIAG IV INF ADDON: CPT

## 2019-06-04 PROCEDURE — 25000128 H RX IP 250 OP 636: Mod: ZF | Performed by: PSYCHIATRY & NEUROLOGY

## 2019-06-04 PROCEDURE — 25800030 ZZH RX IP 258 OP 636: Mod: ZF | Performed by: PSYCHIATRY & NEUROLOGY

## 2019-06-04 RX ORDER — DIPHENHYDRAMINE HCL 25 MG
50 CAPSULE ORAL ONCE
Status: COMPLETED | OUTPATIENT
Start: 2019-06-04 | End: 2019-06-04

## 2019-06-04 RX ORDER — HEPARIN SODIUM (PORCINE) LOCK FLUSH IV SOLN 100 UNIT/ML 100 UNIT/ML
5 SOLUTION INTRAVENOUS
Status: CANCELLED | OUTPATIENT
Start: 2019-06-18

## 2019-06-04 RX ORDER — ACETAMINOPHEN 325 MG/1
650 TABLET ORAL ONCE
Status: CANCELLED | OUTPATIENT
Start: 2019-06-18

## 2019-06-04 RX ORDER — ACETAMINOPHEN 325 MG/1
650 TABLET ORAL ONCE
Status: COMPLETED | OUTPATIENT
Start: 2019-06-04 | End: 2019-06-04

## 2019-06-04 RX ORDER — METHYLPREDNISOLONE SODIUM SUCCINATE 125 MG/2ML
125 INJECTION, POWDER, LYOPHILIZED, FOR SOLUTION INTRAMUSCULAR; INTRAVENOUS ONCE
Status: CANCELLED | OUTPATIENT
Start: 2019-06-18

## 2019-06-04 RX ORDER — METHYLPREDNISOLONE SODIUM SUCCINATE 125 MG/2ML
125 INJECTION, POWDER, LYOPHILIZED, FOR SOLUTION INTRAMUSCULAR; INTRAVENOUS ONCE
Status: COMPLETED | OUTPATIENT
Start: 2019-06-04 | End: 2019-06-04

## 2019-06-04 RX ORDER — HEPARIN SODIUM,PORCINE 10 UNIT/ML
5 VIAL (ML) INTRAVENOUS
Status: CANCELLED | OUTPATIENT
Start: 2019-06-18

## 2019-06-04 RX ORDER — DIPHENHYDRAMINE HCL 25 MG
50 CAPSULE ORAL ONCE
Status: CANCELLED | OUTPATIENT
Start: 2019-06-18

## 2019-06-04 RX ADMIN — OCRELIZUMAB 300 MG: 300 INJECTION INTRAVENOUS at 11:51

## 2019-06-04 RX ADMIN — DIPHENHYDRAMINE HYDROCHLORIDE 50 MG: 25 CAPSULE ORAL at 11:19

## 2019-06-04 RX ADMIN — METHYLPREDNISOLONE SODIUM SUCCINATE 125 MG: 125 INJECTION, POWDER, FOR SOLUTION INTRAMUSCULAR; INTRAVENOUS at 11:19

## 2019-06-04 RX ADMIN — ACETAMINOPHEN 650 MG: 325 TABLET ORAL at 11:19

## 2019-06-04 NOTE — PROGRESS NOTES
Nursing Note  Rusty Serrato [facility] for:   Chief Complaint   Patient presents with     Infusion     ocrevus     During today's [facility] appointment, orders from Dylan Barry MD  were completed.  Frequency: today is dose 1 of 2 total.  ~~~ NOTE: If the patient answers yes to any of the questions below, hold the infusion and contact ordering provider or on-call provider.    1. Have you recently had an elevated temperature, fever, chills, productive cough, coughing for 3 weeks or longer or hemoptysis,  abnormal vital signs, night sweats,  chest pain or have you noticed a decrease in your appetite, unexplained weight loss or fatigue? No  2. Do you have any open wounds or new incisions? No  3. Do you have any recent or upcoming hospitalizations, surgeries or dental procedures? No  4. Do you currently have or recently have had any signs of illness or infection or are you on any antibiotics? No  5. Have you had any new, sudden or worsening abdominal pain? No  6. Have you or anyone in your household received a live vaccination in the past 4 weeks? Please note:  No live vaccines while on biologic/chemotherapy until 6 months after the last treatment.  Patient can receive the flu vaccine (shot only) and the pneumovax.  It is optimal for the patient to get these vaccines mid cycle, but they can be given at any time as long as it is not on the day of the infusion. No  7. Have you recently been diagnosed with any new nervous system diseases (ie. Multiple sclerosis, Guillain Broadus, seizures, neurological changes) or cancer diagnosis? Are you on any form of radiation or chemotherapy? Yes, MS  8. Are you pregnant or breast feeding or do you have plans of pregnancy in the future? No  9. Have you been having any signs of worsening depression or suicidal ideations?  (benlysta only) No  10. Have there been any other new onset medical symptoms?  No    Progress note:  Patient identification verified by name and date of birth.  Assessment completed.  Vitals recorded in Doc Flowsheets.  Patient was provided with education regarding infusion and possible side effects.  Patient verbalized understanding.     present during visit today: Not Applicable.    Treatment Conditions: non-applicable.    Premedications: administered per order.    Drug Waste Record: No    Infusion length and rate:  infusion given over approximately 3 hours    Labs: were not ordered for this appointment.    Vascular access: peripheral IV placed today.    Post Infusion Assessment:  Patient tolerated infusion without incident.  Patient observed for 60 minutes post ocrevus per protocol.     Administrations This Visit     acetaminophen (TYLENOL) tablet 650 mg     Admin Date  06/04/2019 Action  Given Dose  650 mg Route  Oral Administered By  Maggie Carter RN          diphenhydrAMINE (BENADRYL) capsule 50 mg     Admin Date  06/04/2019 Action  Given Dose  50 mg Route  Oral Administered By  Maggie Carter RN          methylPREDNISolone sodium succinate (solu-MEDROL) injection 125 mg     Admin Date  06/04/2019 Action  Given Dose  125 mg Route  Intravenous Administered By  Maggie Carter RN          ocrelizumab (OCREVUS) 300 mg in sodium chloride 0.9 % 250 mL infusion     Admin Date  06/04/2019 Action  New Bag Dose  300 mg Route  Intravenous Administered By  Maggie Carter RN                  Discharge Plan:   Follow up plan of care with: ongoing infusions at [facility].  Discharge instructions were reviewed with patient.  Patient/representative verbalized understanding of discharge instructions and all questions answered.  Patient discharged from [facility] in stable condition.    Maggie Carter RN        /73   Pulse 90   Temp 98.1  F (36.7  C) (Oral)   Resp 16   SpO2 96%

## 2019-06-04 NOTE — PATIENT INSTRUCTIONS
Patient Education     Ocrelizumab injection  Brand Name: OCREVUS  What is this medicine?  OCRELIZUMAB (ok re SYDNEY ue mab) treats multiple sclerosis. It helps to decrease the number of multiple sclerosis relapses. It is not a cure.  How should I use this medicine?  This medicine is for infusion into a vein. It is given by a health care professional in a hospital or clinic setting.  Talk to your pediatrician regarding the use of this medicine in children. Special care may be needed.  What side effects may I notice from receiving this medicine?  Side effects that you should report to your doctor or health care professional as soon as possible:    allergic reactions like skin rash, itching or hives, swelling of the face, lips, or tongue    breathing problems    facial flushing    fast, irregular heartbeat    lump or soreness in the breast    signs and symptoms of herpes such as cold sore, shingles, or genital sores    signs and symptoms of infection like fever or chills, cough, sore throat, pain or trouble passing urine    signs and symptoms of low blood pressure like dizziness; feeling faint or lightheaded, falls; unusually weak or tired    signs and symptoms of progressive multifocal leukoencephalopathy (PML) like changes in vision; clumsiness; confusion; personality changes; weakness on one side of the body    swelling of the ankles, feet, hands  Side effects that usually do not require medical attention (report these to your doctor or health care professional if they continue or are bothersome):    back pain    depressed mood    diarrhea    pain, redness, or irritation at site where injected  What may interact with this medicine?      alemtuzumab    daclizumab    dimethyl fumarate    fingolimod    glatiramer    interferon beta    live virus vaccines    mitoxantrone    natalizumab    peginterferon beta    rituximab    steroid medicines like prednisone or cortisone    teriflunomide  What if I miss a dose?  Keep  appointments for follow-up doses as directed. It is important not to miss your dose. Call your doctor or health care professional if you are unable to keep an appointment.  Where should I keep my medicine?  This drug is given in a hospital or clinic and will not be stored at home.  What should I tell my health care provider before I take this medicine?  They need to know if you have any of these conditions:    cancer    hepatitis B infection    other infection (especially a virus infection such as chickenpox, cold sores, or herpes)    an unusual or allergic reaction to ocrelizumab, other medicines, foods, dyes or preservatives    pregnant or trying to get pregnant    breast-feeding  What should I watch for while using this medicine?  Tell your doctor or healthcare professional if your symptoms do not start to get better or if they get worse.  This medicine can cause serious allergic reactions. To reduce your risk you may need to take medicine before treatment with this medicine. Take your medicine as directed.  Women should inform their doctor if they wish to become pregnant or think they might be pregnant. There is a potential for serious side effects to an unborn child. Talk to your health care professional or pharmacist for more information. Female patients should use effective birth control methods while receiving this medicine and for 6 months after the last dose.  Call your doctor or health care professional for advice if you get a fever, chills or sore throat, or other symptoms of a cold or flu. Do not treat yourself. This drug decreases your body's ability to fight infections. Try to avoid being around people who are sick.  If you have a hepatitis B infection or a history of a hepatitis B infection, talk to your doctor. The symptoms of hepatitis B may get worse if you take this medicine.  In some patients, this medicine may cause a serious brain infection that may cause death. If you have any problems  seeing, thinking, speaking, walking, or standing, tell your doctor right away. If you cannot reach your doctor, urgently seek other source of medical care.  This medicine can decrease the response to a vaccine. If you need to get vaccinated, tell your healthcare professional if you have received this medicine. Extra booster doses may be needed. Talk to your doctor to see if a different vaccination schedule is needed.  Talk to your doctor about your risk of cancer. You may be more at risk for certain types of cancers if you take this medicine.  NOTE:This sheet is a summary. It may not cover all possible information. If you have questions about this medicine, talk to your doctor, pharmacist, or health care provider. Copyright  2018 [employer]

## 2019-06-12 ENCOUNTER — MEDICAL CORRESPONDENCE (OUTPATIENT)
Dept: HEALTH INFORMATION MANAGEMENT | Facility: CLINIC | Age: 44
End: 2019-06-12

## 2019-06-19 ENCOUNTER — INFUSION THERAPY VISIT (OUTPATIENT)
Dept: INFUSION THERAPY | Facility: CLINIC | Age: 44
End: 2019-06-19
Attending: PSYCHIATRY & NEUROLOGY
Payer: MEDICARE

## 2019-06-19 VITALS
RESPIRATION RATE: 16 BRPM | SYSTOLIC BLOOD PRESSURE: 122 MMHG | DIASTOLIC BLOOD PRESSURE: 80 MMHG | HEART RATE: 81 BPM | WEIGHT: 187.7 LBS | OXYGEN SATURATION: 99 % | BODY MASS INDEX: 23.46 KG/M2 | TEMPERATURE: 97.5 F

## 2019-06-19 DIAGNOSIS — G35 MULTIPLE SCLEROSIS (H): Primary | ICD-10-CM

## 2019-06-19 PROCEDURE — 25000128 H RX IP 250 OP 636: Mod: ZF | Performed by: PSYCHIATRY & NEUROLOGY

## 2019-06-19 PROCEDURE — 25800030 ZZH RX IP 258 OP 636: Mod: ZF | Performed by: PSYCHIATRY & NEUROLOGY

## 2019-06-19 PROCEDURE — A9270 NON-COVERED ITEM OR SERVICE: HCPCS | Mod: GY,ZF | Performed by: PSYCHIATRY & NEUROLOGY

## 2019-06-19 PROCEDURE — 25000132 ZZH RX MED GY IP 250 OP 250 PS 637: Mod: GY,ZF | Performed by: PSYCHIATRY & NEUROLOGY

## 2019-06-19 PROCEDURE — 96366 THER/PROPH/DIAG IV INF ADDON: CPT

## 2019-06-19 PROCEDURE — 96365 THER/PROPH/DIAG IV INF INIT: CPT

## 2019-06-19 RX ORDER — HEPARIN SODIUM,PORCINE 10 UNIT/ML
5 VIAL (ML) INTRAVENOUS
Status: CANCELLED | OUTPATIENT
Start: 2019-06-19

## 2019-06-19 RX ORDER — METHYLPREDNISOLONE SODIUM SUCCINATE 125 MG/2ML
125 INJECTION, POWDER, LYOPHILIZED, FOR SOLUTION INTRAMUSCULAR; INTRAVENOUS ONCE
Status: CANCELLED | OUTPATIENT
Start: 2019-06-19

## 2019-06-19 RX ORDER — DIPHENHYDRAMINE HCL 25 MG
50 CAPSULE ORAL ONCE
Status: CANCELLED | OUTPATIENT
Start: 2019-06-19

## 2019-06-19 RX ORDER — ACETAMINOPHEN 325 MG/1
650 TABLET ORAL ONCE
Status: COMPLETED | OUTPATIENT
Start: 2019-06-19 | End: 2019-06-19

## 2019-06-19 RX ORDER — DIPHENHYDRAMINE HCL 25 MG
50 CAPSULE ORAL ONCE
Status: COMPLETED | OUTPATIENT
Start: 2019-06-19 | End: 2019-06-19

## 2019-06-19 RX ORDER — HEPARIN SODIUM (PORCINE) LOCK FLUSH IV SOLN 100 UNIT/ML 100 UNIT/ML
5 SOLUTION INTRAVENOUS
Status: CANCELLED | OUTPATIENT
Start: 2019-06-19

## 2019-06-19 RX ORDER — ACETAMINOPHEN 325 MG/1
650 TABLET ORAL ONCE
Status: CANCELLED | OUTPATIENT
Start: 2019-06-19

## 2019-06-19 RX ADMIN — OCRELIZUMAB 300 MG: 300 INJECTION INTRAVENOUS at 09:11

## 2019-06-19 RX ADMIN — DIPHENHYDRAMINE HYDROCHLORIDE 50 MG: 25 CAPSULE ORAL at 08:51

## 2019-06-19 RX ADMIN — ACETAMINOPHEN 650 MG: 325 TABLET ORAL at 08:51

## 2019-06-19 NOTE — PATIENT INSTRUCTIONS
Dear Rusty Serrato    Thank you for choosing [facility] ([facility]) for your infusion.  The following information is a summary of our appointment as well as important reminders.      Your infusion of ocrevus today.    We look forward in seeing you on your next appointment here at [facility] ([facility]).  Please don t hesitate to call us at [phone] to reschedule any of your appointments or to speak with one of the [facility] registered nurses.  It was a pleasure taking care of you today.    Sincerely,    [facility]  [facility]  [address] [place] [address]  [id number]  Phone:  [phone]  Patient Education     Ocrelizumab injection  Brand Name: OCREVUS  What is this medicine?  OCRELIZUMAB (ok re SYDNEY ue mab) treats multiple sclerosis. It helps to decrease the number of multiple sclerosis relapses. It is not a cure.  How should I use this medicine?  This medicine is for infusion into a vein. It is given by a health care professional in a hospital or clinic setting.  Talk to your pediatrician regarding the use of this medicine in children. Special care may be needed.  What side effects may I notice from receiving this medicine?  Side effects that you should report to your doctor or health care professional as soon as possible:    allergic reactions like skin rash, itching or hives, swelling of the face, lips, or tongue    breathing problems    facial flushing    fast, irregular heartbeat    lump or soreness in the breast    signs and symptoms of herpes such as cold sore, shingles, or genital sores    signs and symptoms of infection like fever or chills, cough, sore throat, pain or trouble passing urine    signs and symptoms of low blood pressure like dizziness; feeling faint or lightheaded, falls; unusually weak or tired    signs and symptoms of progressive multifocal  leukoencephalopathy (PML) like changes in vision; clumsiness; confusion; personality changes; weakness on one side of the body    swelling of the ankles, feet, hands  Side effects that usually do not require medical attention (report these to your doctor or health care professional if they continue or are bothersome):    back pain    depressed mood    diarrhea    pain, redness, or irritation at site where injected  What may interact with this medicine?      alemtuzumab    daclizumab    dimethyl fumarate    fingolimod    glatiramer    interferon beta    live virus vaccines    mitoxantrone    natalizumab    peginterferon beta    rituximab    steroid medicines like prednisone or cortisone    teriflunomide  What if I miss a dose?  Keep appointments for follow-up doses as directed. It is important not to miss your dose. Call your doctor or health care professional if you are unable to keep an appointment.  Where should I keep my medicine?  This drug is given in a hospital or clinic and will not be stored at home.  What should I tell my health care provider before I take this medicine?  They need to know if you have any of these conditions:    cancer    hepatitis B infection    other infection (especially a virus infection such as chickenpox, cold sores, or herpes)    an unusual or allergic reaction to ocrelizumab, other medicines, foods, dyes or preservatives    pregnant or trying to get pregnant    breast-feeding  What should I watch for while using this medicine?  Tell your doctor or healthcare professional if your symptoms do not start to get better or if they get worse.  This medicine can cause serious allergic reactions. To reduce your risk you may need to take medicine before treatment with this medicine. Take your medicine as directed.  Women should inform their doctor if they wish to become pregnant or think they might be pregnant. There is a potential for serious side effects to an unborn child. Talk to your  health care professional or pharmacist for more information. Female patients should use effective birth control methods while receiving this medicine and for 6 months after the last dose.  Call your doctor or health care professional for advice if you get a fever, chills or sore throat, or other symptoms of a cold or flu. Do not treat yourself. This drug decreases your body's ability to fight infections. Try to avoid being around people who are sick.  If you have a hepatitis B infection or a history of a hepatitis B infection, talk to your doctor. The symptoms of hepatitis B may get worse if you take this medicine.  In some patients, this medicine may cause a serious brain infection that may cause death. If you have any problems seeing, thinking, speaking, walking, or standing, tell your doctor right away. If you cannot reach your doctor, urgently seek other source of medical care.  This medicine can decrease the response to a vaccine. If you need to get vaccinated, tell your healthcare professional if you have received this medicine. Extra booster doses may be needed. Talk to your doctor to see if a different vaccination schedule is needed.  Talk to your doctor about your risk of cancer. You may be more at risk for certain types of cancers if you take this medicine.  NOTE:This sheet is a summary. It may not cover all possible information. If you have questions about this medicine, talk to your doctor, pharmacist, or health care provider. Copyright  2018 [employer]

## 2019-06-19 NOTE — PROGRESS NOTES
Nursing Note  Rusty Serrato presents today to [facility] for:   Chief Complaint   Patient presents with     Infusion     Ocrevus (ocrelizumab)     During today's [facility] appointment, orders from Dr. Barry were completed.  Frequency: today is dose 2 of 2 total.    Progress note:  Patient identification verified by name and date of birth.  Assessment completed.  Vitals recorded in Doc Flowsheets.  Patient was provided with education regarding infusion and possible side effects.  Patient verbalized understanding.     present during visit today: Not Applicable.    Treatment Conditions: patient denies fever, chills, signs of infection, recent illness, on antibiotics, productive cough or elevated temperature.  Premedications: administered per order, and declined due to patient had sleeplessness with solumedrol so I received one time order today to hold, if patient returns we will have to get order to change to PRN  Drug Waste Record: No  Infusion length and rate:  infusion starts at 30 ml/hr, then increased by 30 ml/hr every 30 minutes to final rate of 180 ml/hr., over about 2 hours 45 minutes approximately  Labs: were not ordered for this appointment.  Vascular access: peripheral IV placed today.    Post Infusion Assessment:  Patient tolerated infusion without incident.     Discharge Plan:   Follow up plan of care with: ongoing infusions at [facility].  Discharge instructions were reviewed with patient.  Patient/representative verbalized understanding of discharge instructions and all questions answered.  Patient discharged from [facility] in stable condition.    Anni Nance RN    Administrations This Visit     acetaminophen (TYLENOL) tablet 650 mg     Admin Date  06/19/2019 Action  Given Dose  650 mg Route  Oral Administered By  Anni Nance RN          diphenhydrAMINE  (BENADRYL) capsule 50 mg     Admin Date  06/19/2019 Action  Given Dose  50 mg Route  Oral Administered By  Anni Nance RN          ocrelizumab (OCREVUS) 300 mg in sodium chloride 0.9 % 250 mL infusion     Admin Date  06/19/2019 Action  New Bag Dose  300 mg Route  Intravenous Administered By  Anni Nance RN                /78   Pulse 84   Temp 97.5  F (36.4  C) (Oral)   Resp 16   Wt 85.1 kg (187 lb 11.2 oz)   SpO2 99%   BMI 23.46 kg/m       PRIOR TO INFUSION OF BIOLOGICAL MEDICATIONS OR ANY OF THESE AS LISTED: Ocrevus (ocrelizumab)    Prior to Infusion of biological medications or any of these as listed:    1. Elevated temperature, fever, chills, productive cough or abnormal vital signs, night sweats, coughing up blood or sputum, no appetite or abnormal vital signs : NO    2. Open wounds or new incisions: NO    3. Recent hospitalization: NO    4.  Recent surgeries:  NO    5. Any upcoming surgeries or dental procedures?:NO    6. Any current or recent bouts of illness or infection? On any antibiotics? : NO    7. Any new, sudden or worsening abdominal pain :NO    8. Vaccination within 4 weeks? Patient or someone in the household is scheduled to receive vaccination? No live virus vaccines prior to or during treatment :NO    9. Any nervous system diseases [i.e. multiple sclerosis, Guillain-[place], seizures, neurological  changes]: NO    10. Pregnant or breast feeding; or plans on pregnancy in the future: NO    11. Signs of worsening depression or suicidal ideations while taking benlysta:NO    12. New-onset medical symptoms: NO    13.  New cancer diagnosis or on chemotherapy or radiation NO    14.  Evaluate for any sign of active TB [Unexplained weight loss, Loss of appetite, Night sweats, Fever, Fatigue, Chills, Coughing for 3 weeks or longer, Hemoptysis (coughing up blood), Chest pain]: NO    **Note: If answered yes to any of the above, hold the infusion and contact ordering rheumatologist or  on-call rheumatologist.

## 2019-07-03 ENCOUNTER — MEDICAL CORRESPONDENCE (OUTPATIENT)
Dept: HEALTH INFORMATION MANAGEMENT | Facility: CLINIC | Age: 44
End: 2019-07-03

## 2019-07-08 ENCOUNTER — TELEPHONE (OUTPATIENT)
Dept: NEUROLOGY | Facility: CLINIC | Age: 44
End: 2019-07-08

## 2019-07-08 NOTE — TELEPHONE ENCOUNTER
I received the below message from the call center; I talked with Dr. Barry, and we agreed that the patient should go to the ER; I called the patient's mom, Lizeth, and advised this; Rusty usually goes to [facility], so she will call an ambulance to have him brought there and evaluated; She asked me what they should say when they get there, and I told her to tell them exactly what she has been telling me; She asked if the Ocrevus could be playing a role (apparently, some respiratory thing after the Ocrevus), which I told her that I wasn't sure, but that the most important thing right now is for Rusty to be evaluated in the ER for an infection, as we do not want whatever he has going on to turn to sepsis.    Dinora Larry, MS RN Care Coordinator

## 2019-07-08 NOTE — TELEPHONE ENCOUNTER
Health Call Center    Phone Message    May a detailed message be left on voicemail: yes    Reason for Call: Symptoms or Concerns     Current symptom or concern: Rusty currently has a fever of about 100 degrees.  He is not able to walk, unable to get out of bed, or even sit up.  Sukumar states that whenever he has had a temperature of 99 he becomes very symptomatic.  He recently had a second Ocreveus infusion on 6.26.19.      Symptoms have been present for:  4 day(s)    Has patient previously been seen for this? No    By : 4.30.19    Date: Dr. Barry    Are there any new or worsening symptoms? Yes: 4th day of fever    PLEASE CALL HIS WIFE JESSICA IF MOO IS UNAVAILABLE_ SHE MAY BE UNAVAILABLE OVER THE NEXT HOUR- [phone].    Action Taken: Message routed to:  [facility] ([facility] becca neurolgy

## 2019-07-16 ENCOUNTER — RECORDS - HEALTHEAST (OUTPATIENT)
Dept: LAB | Facility: CLINIC | Age: 44
End: 2019-07-16

## 2019-07-16 LAB
ANION GAP SERPL CALCULATED.3IONS-SCNC: 9 MMOL/L (ref 5–18)
BUN SERPL-MCNC: 14 MG/DL (ref 8–22)
CALCIUM SERPL-MCNC: 9.7 MG/DL (ref 8.5–10.5)
CHLORIDE BLD-SCNC: 108 MMOL/L (ref 98–107)
CK SERPL-CCNC: 284 U/L (ref 30–190)
CO2 SERPL-SCNC: 23 MMOL/L (ref 22–31)
CREAT SERPL-MCNC: 1.01 MG/DL (ref 0.7–1.3)
GFR SERPL CREATININE-BSD FRML MDRD: >60 ML/MIN/1.73M2
GLUCOSE BLD-MCNC: 78 MG/DL (ref 70–125)
POTASSIUM BLD-SCNC: 4.4 MMOL/L (ref 3.5–5)
SODIUM SERPL-SCNC: 140 MMOL/L (ref 136–145)

## 2019-08-29 DIAGNOSIS — G35 MULTIPLE SCLEROSIS (H): ICD-10-CM

## 2019-08-29 RX ORDER — AMANTADINE HYDROCHLORIDE 100 MG/1
CAPSULE, GELATIN COATED ORAL
Qty: 180 CAPSULE | Refills: 3 | Status: SHIPPED | OUTPATIENT
Start: 2019-08-29

## 2019-08-29 NOTE — TELEPHONE ENCOUNTER
Rx Authorization:    Requested Medication/ Dose amantadine (SYMMETREL) 100 MG capsule    Date last refill ordered: 4/30/19    Quantity ordered: 60    # refills: 5    Date of last clinic visit with ordering provider: 04/30/19    Date of next clinic visit with ordering provider: 10/29/19    All pertinent protocol data (lab date/result):     Include pertinent information from patients message:     Patient has a refill that is being done now. The pharmacy will inform him when its ready for pickup. This is for further refills.

## 2019-08-29 NOTE — TELEPHONE ENCOUNTER
Received refill request for amantadine from [facility]; Patient was last seen in April and has follow up appointment in October with Dr. Barry; Refilled for 1 year per MS refill protocol.    Dinora aLrry MS RN Care Coordinator

## 2019-09-12 ENCOUNTER — TELEPHONE (OUTPATIENT)
Dept: NEUROLOGY | Facility: CLINIC | Age: 44
End: 2019-09-12

## 2019-09-12 ENCOUNTER — MYC MEDICAL ADVICE (OUTPATIENT)
Dept: NEUROLOGY | Facility: CLINIC | Age: 44
End: 2019-09-12

## 2019-09-12 ENCOUNTER — VIRTUAL VISIT (OUTPATIENT)
Dept: NEUROLOGY | Facility: CLINIC | Age: 44
End: 2019-09-12
Attending: NURSE PRACTITIONER
Payer: MEDICARE

## 2019-09-12 DIAGNOSIS — R25.2 SPASTICITY: Primary | ICD-10-CM

## 2019-09-12 RX ORDER — BACLOFEN 10 MG/1
10 TABLET ORAL 2 TIMES DAILY
Qty: 120 TABLET | Refills: 0 | Status: SHIPPED | OUTPATIENT
Start: 2019-09-12 | End: 2019-10-29

## 2019-09-12 NOTE — TELEPHONE ENCOUNTER
I called both listed phone numbers for this patient for a telephone follow up. I was unable to get a hold off the patient/ mom/ wife. Left a voice message.

## 2019-09-13 NOTE — PROGRESS NOTES
[facility] MULTIPLE SCLEROSIS TELEPHONE VISIT NOTE      Patient and family opted to conduct today's return visit via telephone vs an in person visit to the clinic.I spoke with patient's wife, Jazmin and mom, Lizeth.     The reason for the telephone visit was: Ongoing bowel issues and muscle spasms.     INTERVAL HISTORY SINCE LAST VISIT: I last saw Rusty in 11/2018 and then he had a visit with Dr. Barry on 4/30/2019. Prior to that, he was seen by Neuro-ophthalmology and was diagnosed with new left JAIDEN.     Patient started Ocrelizumab in 6/2019 and had his initial split doses on 06/4/and 6/19/2019. He tolerated the infusions well. He had three hospitalizations since then. In July, one admission from a complicated UTI and a second admission for upper respiratory infection. He had imaging done at that time which did not show any new or enhancing lesions in his brain or spine. Then in August, patient was admitted to the hospital with fever and tachycardia. 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 He was discharged to a TCU afterwards and just got home from there last week.     Today, their major concern is neurogenic bowel with constipation and diarrhea. He was evaluated by Gastro enterology in 3/2019. A regimen with Senna, Castor oil, fiber and Miralax was recommended for the patient. But it sounds like he does not have a warning before bowel movements and is having accidents quite often. Both patient and family is frustrated with this. Since his recent hospitalizations, it sounds like patient has worsening leg weakness. Per his wife, he was able to do some ambulation 6 months ago, but recently he has been wheelchair bound. Depending on his leg weakness, he will need 1-2 person assist for transfers. Also reports significant stiffness/ cramping in his bilateral lower extremities.     Patient currently has a Caraballo catheter which gets changed monthly. He is  also on preventive antibiotics.     Assessment/ Plan:   1. Probable spasticity: We discussed a trial of Baclofen and family is interested in this. Discussed mechanism of action, side effects to watch for. We will start with 10 mg PO at bedtime for 2 weeks, if no side effects, increase to 10 mg twice daily. A prescription was sent to his outpatient pharmacy.     2. Neurogenic Bowel: Follow up with Gastro enterology.     Advice/instructions given to patient/guardian including prescriptions, follow up appointment or orders for diagnostic testing:     Phone call contact time    Call Started at: 11.34 AM    Call Ended at: 12.03 PM

## 2019-09-16 ENCOUNTER — MYC MEDICAL ADVICE (OUTPATIENT)
Dept: NEUROLOGY | Facility: CLINIC | Age: 44
End: 2019-09-16

## 2019-09-17 NOTE — TELEPHONE ENCOUNTER
"Jun Farias,    I am sending this back only has a formality so we can \"done\" this task. We have been having some issues getting tasks out of our inbasket. Feel free to done and get rid of this.    Thanks    "

## 2019-09-25 ENCOUNTER — TELEPHONE (OUTPATIENT)
Dept: NEUROLOGY | Facility: CLINIC | Age: 44
End: 2019-09-25

## 2019-09-25 NOTE — TELEPHONE ENCOUNTER
During our telephone encounter on 9/12/2019, it sounded like Rusty is having a lot of issues with Neurogenic bowel. I reached out to our GI colleague MARI Stephenson. Per Nithya, patient may be a candidate for interstim device which helps with incontinence.     I called Mary this morning and updated her about this. They are interested to have a discussion about this with Dr. Mile Mann. Message sent to Nithya to have their clinic coordinators call the wife and schedule this appointment.

## 2019-09-30 ENCOUNTER — HEALTH MAINTENANCE LETTER (OUTPATIENT)
Age: 44
End: 2019-09-30

## 2019-10-23 NOTE — TELEPHONE ENCOUNTER
RECORDS RECEIVED FROM: Internal    DATE RECEIVED: 12/6/19   NOTES STATUS DETAILS   OFFICE NOTE from referring provider  Internal Referral note 9/25/19    OFFICE NOTE from other specialist   N/A    DISCHARGE SUMMARY from hospital  N/A    DISCHARGE REPORT from the ER N/A    OPERATIVE REPORT  N/A    MEDICATION LIST N/A    LABS     PFC TESTING N/A    ANAL PAP N/A    BIOPSIES/PATHOLOGY RELATED TO DIAGNOSIS N/A    DIAGNOSTIC PROCEDURES     COLONOSCOPY N/A    UPPER ENDOSCOPY (EGD) N/A    FLEX SIGMOIDOSCOPY  N/A    ERCP N/A    IMAGING (DISC & REPORT)      CT  N/A    MRI N/A    XRAY N/A    ULTRASOUND (ENDOANAL/ENDORECTAL) N/A

## 2019-10-29 ENCOUNTER — OFFICE VISIT (OUTPATIENT)
Dept: NEUROLOGY | Facility: CLINIC | Age: 44
End: 2019-10-29
Attending: PSYCHIATRY & NEUROLOGY
Payer: MEDICARE

## 2019-10-29 VITALS
SYSTOLIC BLOOD PRESSURE: 117 MMHG | BODY MASS INDEX: 23 KG/M2 | HEIGHT: 75 IN | DIASTOLIC BLOOD PRESSURE: 86 MMHG | HEART RATE: 102 BPM | WEIGHT: 185 LBS

## 2019-10-29 DIAGNOSIS — G35 MS (MULTIPLE SCLEROSIS) (H): Primary | ICD-10-CM

## 2019-10-29 DIAGNOSIS — R25.2 SPASTICITY: ICD-10-CM

## 2019-10-29 RX ORDER — BACLOFEN 5 MG/1
5 TABLET ORAL 2 TIMES DAILY
Qty: 60 TABLET | Refills: 3 | Status: SHIPPED | OUTPATIENT
Start: 2019-10-29

## 2019-10-29 ASSESSMENT — PAIN SCALES - GENERAL: PAINLEVEL: NO PAIN (0)

## 2019-10-29 ASSESSMENT — MIFFLIN-ST. JEOR: SCORE: 1819.78

## 2019-10-29 NOTE — LETTER
"10/29/2019      RE: Rusty Serrato  [address] [id number]       DEPARTMENT OF NEUROLOGY  MS Clinic Follow Up Visit    Patient Name:  Rusty Serrato  MRN:  [id number]    :  1975  Date of Clinic Visit:  2019  Primary Care Provider:  Simone Bustillo      REASON FOR VISIT: Rusty Serrato is a 44 year old man who follows in clinic for primary progressive multiple sclerosis. He was last seen in clinic on 19 by Dr. Barry.     INTERVAL HISTORY: Mr. Serrato is accompanied by his mother and his PCA who is also his cousin. Things have unfortunately worsened since he was last seen in clinic. He received his first infusion of ocrelizumab in  and was subsequently hospitalized three times for various infections (URI, UTI). With each hospitalization they felt that his MS symptoms took a step down. He is weaker on his left side with increased spasticity and with frequent spasms; they never filled the baclofen prescription. He continues to have issues with neurogenic bowel and is continuously constipated. He has a chronic Caraballo catheter due to neurogenic bladder. He is becoming more uncoordinated and weak on his left arm and is needing to use his right arm more (ie for eating) despite being left handed. He was on a specialized diet in the hospital due to aspiration but is now back on a regular diet and denies any issues with swallowing.     Vital signs:      BP: 117/86 Pulse: 102           Height: 190.5 cm (6' 3\") Weight: 83.9 kg (185 lb)(Verbal given- taken at another doctor appointment)  Estimated body mass index is 23.12 kg/m  as calculated from the following:    Height as of this encounter: 1.905 m (6' 3\").    Weight as of this encounter: 83.9 kg (185 lb).    Examination:  General: Adult male in no acute distress, sitting in wheelchair    Neuro:  Mental Status: Fully alert, attentive. Spontaneous speech.  Cranial Nerves: Visual fields intact. PERRL. Partial JAIDEN b/l. Facial movements " symmetric. Hearing intact to conversation. Spastic dysarthria.   Motor: No abnormal movements. Pronator drift on the L. Increased tone in LUE and LLE.      Right Left   Shoulder abduction:       5 4+   Elbow extension: 5 4+   Elbow flexion:             5 4   Wrist extension:        5 4   FDI 5 3   Handgrip 5 3   Hip flexion 5 4-   Knee flexion 5 4   Knee extension 5 3   Dorsiflexion 5 3   Plantar flexion 5 2       Reflexes: Brisk and asymmetric L>R at the patella, achilles, biceps, brachioradialis. 8-9 beats of clonus at the left ankle.   Sensory: Intact and symmetric light touch sensation in the b/l LE.   Coordination: Ataxia with FNF on the left although somewhat limited by weakness. R FNF with ?mild ataxia.  Station/Gait: Deferred      INVESTIGATIONS:  All available and relevant labs, imaging, and other procedures were reviewed personally.   MRI brain/c-spine/t-spine from 07/2019 (reports only available) - no interval change by report    IMPRESSION/RECOMMENDATIONS:     #Primary progressive multiple sclerosis:  Mr. Serrato has clinically declined over the interval despite starting ocrelizumab therapy. Unclear how much of this is related to disease progression vs deconditioning in the setting of recurrent infections and hospitalizations. Options for treatment of PPMS are limited with ocrelizumab having the greatest evidence for benefit. Another option is using pulse steroids with a large dose (1 g methlyprednisolone or equivalent) every month. These options were discussed with the patient and his family and the decision was made to monitor B-cell levels in December (six months from infusion) with the plan to continue ocrelizumab for now. For symptomatic relief we will prescribe a low dose baclofen (he never started taking the previously prescribed dose) with the caveat that this may worsen his strength and complicate his transfers; patient and family were in agreement.     -Check CD-19 count in December, plan to redose  ocrelizumab when counts are rising  -Follow up with Jyotsna Montez in six months and with Dr. Barry in one year    Patient care discussed with attending neurologist, Dr. Barry, who agrees with my assessment and plan.    South Gonzalez MD  Neurology PGY-4    I saw and examined this patient, and have read and edited the resident's note.  I agree with the findings, assessment, and plan.  I spent 28 minutes with the patient, greater than 50% in counseling and coordination of care.  We mainly discussed whether the ocrelizumab is helping.  They feel Rusty is continuing to decline (MS progression), and I tend to agree - his left hemiparesis is worse today.  This is complicated by apparent step-wise functional declines associated with infections/hospitalizations.  Given the lack of other proven treatment options, we decided to continue it.  Plan as above.    Dylan Barry MD

## 2019-10-29 NOTE — PROGRESS NOTES
"  DEPARTMENT OF NEUROLOGY  MS Clinic Follow Up Visit    Patient Name:  Rusty Serrato  MRN:  [id number]    :  1975  Date of Clinic Visit:  2019  Primary Care Provider:  Simone Bustillo        REASON FOR VISIT: Rusty Serrato is a 44 year old man who follows in clinic for primary progressive multiple sclerosis. He was last seen in clinic on 19 by Dr. Barry.     INTERVAL HISTORY: Mr. Serrato is accompanied by his mother and his PCA who is also his cousin. Things have unfortunately worsened since he was last seen in clinic. He received his first infusion of ocrelizumab in  and was subsequently hospitalized three times for various infections (URI, UTI). With each hospitalization they felt that his MS symptoms took a step down. He is weaker on his left side with increased spasticity and with frequent spasms; they never filled the baclofen prescription. He continues to have issues with neurogenic bowel and is continuously constipated. He has a chronic Caraballo catheter due to neurogenic bladder. He is becoming more uncoordinated and weak on his left arm and is needing to use his right arm more (ie for eating) despite being left handed. He was on a specialized diet in the hospital due to aspiration but is now back on a regular diet and denies any issues with swallowing.     Vital signs:      BP: 117/86 Pulse: 102           Height: 190.5 cm (6' 3\") Weight: 83.9 kg (185 lb)(Verbal given- taken at another doctor appointment)  Estimated body mass index is 23.12 kg/m  as calculated from the following:    Height as of this encounter: 1.905 m (6' 3\").    Weight as of this encounter: 83.9 kg (185 lb).    Examination:  General: Adult male in no acute distress, sitting in wheelchair    Neuro:  Mental Status: Fully alert, attentive. Spontaneous speech.  Cranial Nerves: Visual fields intact. PERRL. Partial JAIDEN b/l. Facial movements symmetric. Hearing intact to conversation. Spastic dysarthria.   Motor: No abnormal " movements. Pronator drift on the L. Increased tone in LUE and LLE.      Right Left   Shoulder abduction:       5 4+   Elbow extension: 5 4+   Elbow flexion:             5 4   Wrist extension:        5 4   FDI 5 3   Handgrip 5 3   Hip flexion 5 4-   Knee flexion 5 4   Knee extension 5 3   Dorsiflexion 5 3   Plantar flexion 5 2       Reflexes: Brisk and asymmetric L>R at the patella, achilles, biceps, brachioradialis. 8-9 beats of clonus at the left ankle.   Sensory: Intact and symmetric light touch sensation in the b/l LE.   Coordination: Ataxia with FNF on the left although somewhat limited by weakness. R FNF with ?mild ataxia.  Station/Gait: Deferred      INVESTIGATIONS:  All available and relevant labs, imaging, and other procedures were reviewed personally.   MRI brain/c-spine/t-spine from 07/2019 (reports only available) - no interval change by report    IMPRESSION/RECOMMENDATIONS:     #Primary progressive multiple sclerosis:  Mr. Serrato has clinically declined over the interval despite starting ocrelizumab therapy. Unclear how much of this is related to disease progression vs deconditioning in the setting of recurrent infections and hospitalizations. Options for treatment of PPMS are limited with ocrelizumab having the greatest evidence for benefit. Another option is using pulse steroids with a large dose (1 g methlyprednisolone or equivalent) every month. These options were discussed with the patient and his family and the decision was made to monitor B-cell levels in December (six months from infusion) with the plan to continue ocrelizumab for now. For symptomatic relief we will prescribe a low dose baclofen (he never started taking the previously prescribed dose) with the caveat that this may worsen his strength and complicate his transfers; patient and family were in agreement.     -Check CD-19 count in December, plan to redose ocrelizumab when counts are rising  -Follow up with Jyotsna Montez in six months and  with Dr. Barry in one year    Patient care discussed with attending neurologist, Dr. Barry, who agrees with my assessment and plan.    South Gonzalez MD  Neurology PGY-4       I saw and examined this patient, and have read and edited the resident's note.  I agree with the findings, assessment, and plan.  I spent 28 minutes with the patient, greater than 50% in counseling and coordination of care.  We mainly discussed whether the ocrelizumab is helping.  They feel Rusty is continuing to decline (MS progression), and I tend to agree - his left hemiparesis is worse today.  This is complicated by apparent step-wise functional declines associated with infections/hospitalizations.  Given the lack of other proven treatment options, we decided to continue it.  Plan as above.    Dylan Barry MD

## 2019-11-26 ENCOUNTER — MEDICAL CORRESPONDENCE (OUTPATIENT)
Dept: HEALTH INFORMATION MANAGEMENT | Facility: CLINIC | Age: 44
End: 2019-11-26

## 2019-12-06 ENCOUNTER — PRE VISIT (OUTPATIENT)
Dept: SURGERY | Facility: CLINIC | Age: 44
End: 2019-12-06

## 2019-12-09 ENCOUNTER — TELEPHONE (OUTPATIENT)
Dept: NEUROLOGY | Facility: CLINIC | Age: 44
End: 2019-12-09

## 2019-12-09 DIAGNOSIS — G35 MULTIPLE SCLEROSIS (H): Primary | ICD-10-CM

## 2019-12-09 NOTE — TELEPHONE ENCOUNTER
Patient is due to have his CD19 B cell count checked mid-December per Dr. Barry; Lab order placed per Dr. Barry; I called Lizeth, patient's mom, and she would like Rusty to get the lab checked at [facility] (phone [phone] fax [phone]); I will fax the lab orders once they have been cosigned.    Dinora Larry, MS RN Care Coordinator

## 2019-12-10 NOTE — TELEPHONE ENCOUNTER
Lab order cosigned by Dr. Barry; This has been faxed to [facility] per patient's mom's request.    Dinora Larry MS RN Care Coordinator

## 2019-12-31 NOTE — TELEPHONE ENCOUNTER
I called [facility] regarding any results, and they said that they don't even have an order; Lab order re-faxed accordingly.    Dinora Larry, MS RN Care Coordinator

## 2020-01-20 NOTE — TELEPHONE ENCOUNTER
Rusty still hasn't had his lab work done; I left [facility] for Lizeth with a reminder and to let us know if she had any questions or issues.    Dinora Larry, MS RN Care Coordinator

## 2020-02-04 NOTE — TELEPHONE ENCOUNTER
Called and spoke with patient's mom, Lizeth. She is wondering where things are at with the Ampyra and Ocrevus. I informed her that I had spoke to Rusty on 4/19 letting him know he was cleared to schedule his Ocrevus infusions and provided him with the number to [facility]. And that the Ampyra was approved but the patient has a high copay. She has several financial questions that I am unable to answer. I told her I would have Link, pharmacy liaison call her to discuss this.     · Will continue patient's statin medication

## 2020-03-15 ENCOUNTER — HEALTH MAINTENANCE LETTER (OUTPATIENT)
Age: 45
End: 2020-03-15

## 2020-07-09 DIAGNOSIS — G35 MULTIPLE SCLEROSIS (H): Primary | ICD-10-CM

## 2020-07-09 LAB
BASOPHILS # BLD AUTO: 0 10E9/L (ref 0–0.2)
BASOPHILS NFR BLD AUTO: 0.6 %
DIFFERENTIAL METHOD BLD: NORMAL
EOSINOPHIL # BLD AUTO: 0.1 10E9/L (ref 0–0.7)
EOSINOPHIL NFR BLD AUTO: 1.4 %
ERYTHROCYTE [DISTWIDTH] IN BLOOD BY AUTOMATED COUNT: 12.4 % (ref 10–15)
HCT VFR BLD AUTO: 45.8 % (ref 40–53)
HGB BLD-MCNC: 15.8 G/DL (ref 13.3–17.7)
IMM GRANULOCYTES # BLD: 0 10E9/L (ref 0–0.4)
IMM GRANULOCYTES NFR BLD: 0.1 %
LYMPHOCYTES # BLD AUTO: 1.3 10E9/L (ref 0.8–5.3)
LYMPHOCYTES NFR BLD AUTO: 18.2 %
MCH RBC QN AUTO: 31.5 PG (ref 26.5–33)
MCHC RBC AUTO-ENTMCNC: 34.5 G/DL (ref 31.5–36.5)
MCV RBC AUTO: 91 FL (ref 78–100)
MONOCYTES # BLD AUTO: 0.6 10E9/L (ref 0–1.3)
MONOCYTES NFR BLD AUTO: 7.9 %
NEUTROPHILS # BLD AUTO: 5.1 10E9/L (ref 1.6–8.3)
NEUTROPHILS NFR BLD AUTO: 71.8 %
NRBC # BLD AUTO: 0 10*3/UL
NRBC BLD AUTO-RTO: 0 /100
PLATELET # BLD AUTO: 224 10E9/L (ref 150–450)
RBC # BLD AUTO: 5.01 10E12/L (ref 4.4–5.9)
WBC # BLD AUTO: 7.1 10E9/L (ref 4–11)

## 2020-07-09 PROCEDURE — 86355 B CELLS TOTAL COUNT: CPT

## 2020-07-09 PROCEDURE — 85025 COMPLETE CBC W/AUTO DIFF WBC: CPT

## 2020-07-10 LAB
CD19 CELLS # BLD: 27 CELLS/UL (ref 107–698)
CD19 CELLS NFR BLD: 2 % (ref 6–27)

## 2020-12-17 ENCOUNTER — MEDICAL CORRESPONDENCE (OUTPATIENT)
Dept: HEALTH INFORMATION MANAGEMENT | Facility: CLINIC | Age: 45
End: 2020-12-17

## 2020-12-17 LAB
BASOPHILS # BLD AUTO: 0.1 10E9/L (ref 0–0.2)
BASOPHILS NFR BLD AUTO: 0.7 %
CD19 CELLS # BLD: 48 CELLS/UL (ref 107–698)
CD19 CELLS NFR BLD: 3 % (ref 6–27)
DIFFERENTIAL METHOD BLD: NORMAL
EOSINOPHIL # BLD AUTO: 0.1 10E9/L (ref 0–0.7)
EOSINOPHIL NFR BLD AUTO: 1.2 %
ERYTHROCYTE [DISTWIDTH] IN BLOOD BY AUTOMATED COUNT: 12.1 % (ref 10–15)
HCT VFR BLD AUTO: 47.8 % (ref 40–53)
HGB BLD-MCNC: 16.6 G/DL (ref 13.3–17.7)
IMM GRANULOCYTES # BLD: 0 10E9/L (ref 0–0.4)
IMM GRANULOCYTES NFR BLD: 0.5 %
LYMPHOCYTES # BLD AUTO: 1.6 10E9/L (ref 0.8–5.3)
LYMPHOCYTES NFR BLD AUTO: 18.6 %
MCH RBC QN AUTO: 30.9 PG (ref 26.5–33)
MCHC RBC AUTO-ENTMCNC: 34.7 G/DL (ref 31.5–36.5)
MCV RBC AUTO: 89 FL (ref 78–100)
MONOCYTES # BLD AUTO: 0.6 10E9/L (ref 0–1.3)
MONOCYTES NFR BLD AUTO: 7 %
NEUTROPHILS # BLD AUTO: 6.2 10E9/L (ref 1.6–8.3)
NEUTROPHILS NFR BLD AUTO: 72 %
NRBC # BLD AUTO: 0 10*3/UL
NRBC BLD AUTO-RTO: 0 /100
PLATELET # BLD AUTO: 270 10E9/L (ref 150–450)
RBC # BLD AUTO: 5.38 10E12/L (ref 4.4–5.9)
VIT B12 SERPL-MCNC: 287 PG/ML (ref 193–986)
WBC # BLD AUTO: 8.6 10E9/L (ref 4–11)

## 2020-12-17 PROCEDURE — 86355 B CELLS TOTAL COUNT: CPT | Performed by: PSYCHIATRY & NEUROLOGY

## 2020-12-17 PROCEDURE — 82607 VITAMIN B-12: CPT | Performed by: PSYCHIATRY & NEUROLOGY

## 2020-12-17 PROCEDURE — 85025 COMPLETE CBC W/AUTO DIFF WBC: CPT | Performed by: PSYCHIATRY & NEUROLOGY

## 2020-12-17 PROCEDURE — 82306 VITAMIN D 25 HYDROXY: CPT | Performed by: PSYCHIATRY & NEUROLOGY

## 2020-12-19 LAB — DEPRECATED CALCIDIOL+CALCIFEROL SERPL-MC: 61 UG/L (ref 20–75)

## 2021-01-15 ENCOUNTER — HEALTH MAINTENANCE LETTER (OUTPATIENT)
Age: 46
End: 2021-01-15

## 2021-02-01 ENCOUNTER — APPOINTMENT (OUTPATIENT)
Dept: LAB | Facility: CLINIC | Age: 46
End: 2021-02-01
Attending: FAMILY MEDICINE
Payer: MEDICARE

## 2021-02-23 ENCOUNTER — HOME INFUSION (PRE-WILLOW HOME INFUSION) (OUTPATIENT)
Dept: PHARMACY | Facility: CLINIC | Age: 46
End: 2021-02-23

## 2021-02-24 NOTE — PROGRESS NOTES
This is a recent snapshot of the patient's [facility] medical record.  For current drug dose and complete information and questions, call [phone] or In Basket pool, fv home infusion ([id number])  CSN Number:  [id number]

## 2021-03-01 ENCOUNTER — APPOINTMENT (OUTPATIENT)
Dept: LAB | Facility: CLINIC | Age: 46
End: 2021-03-01
Attending: FAMILY MEDICINE
Payer: MEDICARE

## 2021-03-02 ENCOUNTER — HOME INFUSION (PRE-WILLOW HOME INFUSION) (OUTPATIENT)
Dept: PHARMACY | Facility: CLINIC | Age: 46
End: 2021-03-02

## 2021-03-03 NOTE — PROGRESS NOTES
This is a recent snapshot of the patient's [facility] medical record.  For current drug dose and complete information and questions, call [phone] or In Basket pool, fv home infusion ([id number])  CSN Number:  [id number]

## 2021-03-05 ENCOUNTER — HOME INFUSION (PRE-WILLOW HOME INFUSION) (OUTPATIENT)
Dept: PHARMACY | Facility: CLINIC | Age: 46
End: 2021-03-05

## 2021-03-08 NOTE — PROGRESS NOTES
This is a recent snapshot of the patient's [facility] medical record.  For current drug dose and complete information and questions, call [phone] or In Basket pool, fv home infusion ([id number])  CSN Number:  [id number]

## 2021-04-01 ENCOUNTER — APPOINTMENT (OUTPATIENT)
Dept: LAB | Facility: CLINIC | Age: 46
End: 2021-04-01
Attending: FAMILY MEDICINE
Payer: MEDICARE

## 2021-04-06 ENCOUNTER — OFFICE VISIT (OUTPATIENT)
Dept: OPHTHALMOLOGY | Facility: CLINIC | Age: 46
End: 2021-04-06
Attending: OPHTHALMOLOGY
Payer: MEDICARE

## 2021-04-06 DIAGNOSIS — H47.20 OPTIC ATROPHY: ICD-10-CM

## 2021-04-06 DIAGNOSIS — H53.40 VISUAL FIELD DEFECT: ICD-10-CM

## 2021-04-06 DIAGNOSIS — H53.40 VISUAL FIELD DEFECT: Primary | ICD-10-CM

## 2021-04-06 DIAGNOSIS — H51.22 INTERNUCLEAR OPHTHALMOPLEGIA OF LEFT EYE: ICD-10-CM

## 2021-04-06 DIAGNOSIS — G35 MULTIPLE SCLEROSIS (H): Primary | ICD-10-CM

## 2021-04-06 PROCEDURE — 92014 COMPRE OPH EXAM EST PT 1/>: CPT | Performed by: OPHTHALMOLOGY

## 2021-04-06 PROCEDURE — 92083 EXTENDED VISUAL FIELD XM: CPT | Performed by: OPHTHALMOLOGY

## 2021-04-06 PROCEDURE — 92133 CPTRZD OPH DX IMG PST SGM ON: CPT | Performed by: OPHTHALMOLOGY

## 2021-04-06 PROCEDURE — G0463 HOSPITAL OUTPT CLINIC VISIT: HCPCS | Mod: 25

## 2021-04-06 ASSESSMENT — SLIT LAMP EXAM - LIDS
COMMENTS: MILD BLEPHARITIS
COMMENTS: MILD BLEPHARITIS

## 2021-04-06 ASSESSMENT — CONF VISUAL FIELD
OS_NORMAL: 1
METHOD: COUNTING FINGERS
OD_NORMAL: 1

## 2021-04-06 ASSESSMENT — REFRACTION_WEARINGRX
OD_AXIS: 047
SPECS_TYPE: SVL
OS_AXIS: 108
OS_SPHERE: -5.75
OD_SPHERE: -5.75
OD_CYLINDER: +0.50
OS_CYLINDER: +0.25

## 2021-04-06 ASSESSMENT — VISUAL ACUITY
METHOD: SNELLEN - LINEAR
CORRECTION_TYPE: GLASSES
OD_CC: 20/50
OS_CC: 20/40

## 2021-04-06 ASSESSMENT — REFRACTION_MANIFEST
OD_CYLINDER: +0.75
OS_CYLINDER: +0.50
OD_AXIS: 037
OS_SPHERE: -6.25
OS_AXIS: 110
OD_SPHERE: -6.25

## 2021-04-06 ASSESSMENT — CUP TO DISC RATIO
OS_RATIO: 0.2
OD_RATIO: 0.2

## 2021-04-06 ASSESSMENT — TONOMETRY
IOP_METHOD: ICARE
OD_IOP_MMHG: 10
OS_IOP_MMHG: 10

## 2021-04-06 ASSESSMENT — EXTERNAL EXAM - LEFT EYE: OS_EXAM: NORMAL

## 2021-04-06 ASSESSMENT — EXTERNAL EXAM - RIGHT EYE: OD_EXAM: NORMAL

## 2021-04-06 NOTE — PATIENT INSTRUCTIONS
You have been diagnosed with Dry eye syndrome.  Symptoms of Dry eye syndrome can include double vision, eye pain, blurry vision, stinging, burning, tearing, eye redness.      Some useful instructions are listed below:     1.  Use artificial tears on a regular basis.  If you use artificial tear drops with preservative, you can use them up to 4-6 times per day. If you use artificial tear drops without preservatives, then you can use them more often.      2.  Wash your eyelashes with hot water.  Do not make the water so hot that you burn yourself, but the water should be more than just warm.  Often patients will wash their eyelashes in the shower under the hot water.  You should rub gently along the base of your eyelashes.      3.  Taking fish oil capsules twice a day for a month and then once a day after that can help improve Dry eye symptoms.      4.  Drink a lot of water.  Hydration can be helpful.      5.  Humidify your environment.      6.  If this is not beneficial, other treatments can include punctal plugs or cautery, corticosteroid eye drops, Restasis, Lipiflow, or autologous serum.  If you are still struggling with dry eye symptoms, call Dr. Zurita's office for other therapies or a referral to a dry eye specialist.

## 2021-04-06 NOTE — PROGRESS NOTES
Assessment & Plan     Rusty Serrato is a 46 year old male with the following diagnoses:   1. Multiple sclerosis (H)    2. Visual field defect    3. Optic atrophy    4. Internuclear ophthalmoplegia of left eye       Patient is here for follow up of optic atrophy.  Last visit 4/2019.  He has a history of primary progressive multiple sclerosis. Last visit his visual field and optical coherence tomography were stable but he appeared to have a new left internuclear ophthalmoplegia.  Within the last year he began to notice his vision being down in both eyes.  It is constant.      Best corrected visual acuity today 20/30 RIGHT eye and 20/40 left eye.  Left internuclear ophthalmoplegia still present.  He has mild blepharitis both eyes.  Trace punctate epithelial keratopathy both eyes with tear break up time 5 seconds RIGHT eye and 9 seconds left eye.  Fundus exam with temporal pallor both eyes.  Visual field and optical coherence tomography stable both eyes.     It is my impression that patient has stable optic atrophy both eyes with history of primary progressive multiple sclerosis.  He has continued left internuclear ophthalmoplegia.  His vision is down today.  I do not think this is due to worsening of multiple sclerosis with stable optical coherence tomography.  It is possible this is due to dry eyes. It is also possible this is due to heat and fatigue.  He and his wife state that his vision worsens when he is hot and tired. He is very tired today and it is warm in the office.         I asked the patient to use artificial tears as well as warm compresses to the eyelid margin  on a regular basis.  I asked the patient to take fish oil capsules 2x/day for a month and then 1x/d thereafter.   If that is not beneficial we could consider punctal plugs, corticosteroid eye drops and Restasis.            Attending Physician Attestation:  Complete documentation of historical and exam elements from today's encounter can be  found in the full encounter summary report (not reduplicated in this progress note).  I personally obtained the chief complaint(s) and history of present illness.  I confirmed and edited as necessary the review of systems, past medical/surgical history, family history, social history, and examination findings as documented by others; and I examined the patient myself.  I personally reviewed the relevant tests, images, and reports as documented above.  I formulated and edited as necessary the assessment and plan and discussed the findings and management plan with the patient and family. - Bear Zurita MD

## 2021-05-03 ENCOUNTER — HOME INFUSION (PRE-WILLOW HOME INFUSION) (OUTPATIENT)
Dept: PHARMACY | Facility: CLINIC | Age: 46
End: 2021-05-03

## 2021-05-05 ENCOUNTER — HOME INFUSION (PRE-WILLOW HOME INFUSION) (OUTPATIENT)
Dept: PHARMACY | Facility: CLINIC | Age: 46
End: 2021-05-05

## 2021-05-06 NOTE — PROGRESS NOTES
This is a recent snapshot of the patient's [facility] medical record.  For current drug dose and complete information and questions, call [phone] or In Basket pool, fv home infusion ([id number])  CSN Number:  [id number]

## 2021-05-07 NOTE — PROGRESS NOTES
This is a recent snapshot of the patient's [facility] medical record.  For current drug dose and complete information and questions, call [phone] or In Basket pool, fv home infusion ([id number])  CSN Number:  [id number]

## 2021-05-09 ENCOUNTER — HEALTH MAINTENANCE LETTER (OUTPATIENT)
Age: 46
End: 2021-05-09

## 2021-06-26 ENCOUNTER — HOME INFUSION (PRE-WILLOW HOME INFUSION) (OUTPATIENT)
Dept: PHARMACY | Facility: CLINIC | Age: 46
End: 2021-06-26

## 2021-07-01 ENCOUNTER — APPOINTMENT (OUTPATIENT)
Dept: LAB | Facility: CLINIC | Age: 46
End: 2021-07-01
Attending: FAMILY MEDICINE
Payer: MEDICARE

## 2021-07-05 NOTE — PROGRESS NOTES
This is a recent snapshot of the patient's [facility] medical record.  For current drug dose and complete information and questions, call [phone] or In Basket pool, fv home infusion ([id number])  CSN Number:  [id number]

## 2021-07-21 ENCOUNTER — HOME INFUSION (PRE-WILLOW HOME INFUSION) (OUTPATIENT)
Dept: PHARMACY | Facility: CLINIC | Age: 46
End: 2021-07-21

## 2021-07-27 ENCOUNTER — HOME INFUSION (PRE-WILLOW HOME INFUSION) (OUTPATIENT)
Dept: PHARMACY | Facility: CLINIC | Age: 46
End: 2021-07-27

## 2021-08-02 NOTE — PROGRESS NOTES
This is a recent snapshot of the patient's [facility] medical record.  For current drug dose and complete information and questions, call [phone] or In Basket pool, fv home infusion ([id number])  CSN Number:  [id number]

## 2021-08-02 NOTE — PROGRESS NOTES
This is a recent snapshot of the patient's [facility] medical record.  For current drug dose and complete information and questions, call [phone] or In Basket pool, fv home infusion ([id number])  CSN Number:  [id number]

## 2021-08-21 ENCOUNTER — HOME INFUSION (PRE-WILLOW HOME INFUSION) (OUTPATIENT)
Dept: PHARMACY | Facility: CLINIC | Age: 46
End: 2021-08-21

## 2021-10-13 ENCOUNTER — HOME INFUSION (PRE-WILLOW HOME INFUSION) (OUTPATIENT)
Dept: PHARMACY | Facility: CLINIC | Age: 46
End: 2021-10-13
Payer: MEDICARE

## 2021-10-24 ENCOUNTER — HEALTH MAINTENANCE LETTER (OUTPATIENT)
Age: 46
End: 2021-10-24

## 2021-12-30 ENCOUNTER — HOME INFUSION (PRE-WILLOW HOME INFUSION) (OUTPATIENT)
Dept: PHARMACY | Facility: CLINIC | Age: 46
End: 2021-12-30
Payer: MEDICARE

## 2022-01-26 NOTE — PROGRESS NOTES
This is a recent snapshot of the patient's [facility] medical record.  For current drug dose and complete information and questions, call [phone] or In Basket pool, fv home infusion ([id number])  CSN Number:  [id number]

## 2022-03-01 ENCOUNTER — LAB REQUISITION (OUTPATIENT)
Dept: LAB | Facility: CLINIC | Age: 47
End: 2022-03-01
Payer: MEDICARE

## 2022-03-01 DIAGNOSIS — Z11.1 ENCOUNTER FOR SCREENING FOR RESPIRATORY TUBERCULOSIS: ICD-10-CM

## 2022-03-02 PROCEDURE — P9603 ONE-WAY ALLOW PRORATED MILES: HCPCS | Mod: ORL | Performed by: INTERNAL MEDICINE

## 2022-03-02 PROCEDURE — 86481 TB AG RESPONSE T-CELL SUSP: CPT | Mod: ORL | Performed by: INTERNAL MEDICINE

## 2022-03-02 PROCEDURE — 36415 COLL VENOUS BLD VENIPUNCTURE: CPT | Mod: ORL | Performed by: INTERNAL MEDICINE

## 2022-03-03 ENCOUNTER — LAB REQUISITION (OUTPATIENT)
Dept: LAB | Facility: CLINIC | Age: 47
End: 2022-03-03
Payer: MEDICARE

## 2022-03-03 DIAGNOSIS — J18.9 PNEUMONIA, UNSPECIFIED ORGANISM: ICD-10-CM

## 2022-03-03 LAB
GAMMA INTERFERON BACKGROUND BLD IA-ACNC: 0.04 IU/ML
M TB IFN-G BLD-IMP: NEGATIVE
M TB IFN-G CD4+ BCKGRND COR BLD-ACNC: 9.96 IU/ML
MITOGEN IGNF BCKGRD COR BLD-ACNC: -0.01 IU/ML
MITOGEN IGNF BCKGRD COR BLD-ACNC: 0 IU/ML
QUANTIFERON MITOGEN: 10 IU/ML
QUANTIFERON NIL TUBE: 0.04 IU/ML
QUANTIFERON TB1 TUBE: 0.03 IU/ML
QUANTIFERON TB2 TUBE: 0.04

## 2022-03-05 LAB
ANION GAP SERPL CALCULATED.3IONS-SCNC: 10 MMOL/L (ref 5–18)
BUN SERPL-MCNC: 24 MG/DL (ref 8–22)
CALCIUM SERPL-MCNC: 9.4 MG/DL (ref 8.5–10.5)
CHLORIDE BLD-SCNC: 104 MMOL/L (ref 98–107)
CO2 SERPL-SCNC: 25 MMOL/L (ref 22–31)
CREAT SERPL-MCNC: 0.82 MG/DL (ref 0.7–1.3)
ERYTHROCYTE [DISTWIDTH] IN BLOOD BY AUTOMATED COUNT: 13 % (ref 10–15)
GFR SERPL CREATININE-BSD FRML MDRD: >90 ML/MIN/1.73M2
GLUCOSE BLD-MCNC: 100 MG/DL (ref 70–125)
HCT VFR BLD AUTO: 44 % (ref 40–53)
HGB BLD-MCNC: 14.8 G/DL (ref 13.3–17.7)
MCH RBC QN AUTO: 31.3 PG (ref 26.5–33)
MCHC RBC AUTO-ENTMCNC: 33.6 G/DL (ref 31.5–36.5)
MCV RBC AUTO: 93 FL (ref 78–100)
PLATELET # BLD AUTO: 326 10E3/UL (ref 150–450)
POTASSIUM BLD-SCNC: 3.9 MMOL/L (ref 3.5–5)
RBC # BLD AUTO: 4.73 10E6/UL (ref 4.4–5.9)
SODIUM SERPL-SCNC: 139 MMOL/L (ref 136–145)
WBC # BLD AUTO: 15.1 10E3/UL (ref 4–11)

## 2022-03-05 PROCEDURE — 80048 BASIC METABOLIC PNL TOTAL CA: CPT | Performed by: NURSE PRACTITIONER

## 2022-03-05 PROCEDURE — 36415 COLL VENOUS BLD VENIPUNCTURE: CPT | Performed by: NURSE PRACTITIONER

## 2022-03-05 PROCEDURE — P9603 ONE-WAY ALLOW PRORATED MILES: HCPCS | Performed by: NURSE PRACTITIONER

## 2022-03-05 PROCEDURE — 85027 COMPLETE CBC AUTOMATED: CPT | Performed by: NURSE PRACTITIONER

## 2022-04-14 NOTE — PROGRESS NOTES
This is a recent snapshot of the patient's [facility] medical record.  For current drug dose and complete information and questions, call [phone] or In Basket pool, fv home infusion ([id number])  CSN Number:  [id number]

## 2022-05-03 ENCOUNTER — HOME INFUSION (PRE-WILLOW HOME INFUSION) (OUTPATIENT)
Dept: PHARMACY | Facility: CLINIC | Age: 47
End: 2022-05-03
Payer: MEDICARE

## 2022-05-04 ENCOUNTER — TELEPHONE (OUTPATIENT)
Dept: OPHTHALMOLOGY | Facility: CLINIC | Age: 47
End: 2022-05-04
Payer: MEDICARE

## 2022-05-04 NOTE — TELEPHONE ENCOUNTER
The patient's emergency contact called asking for the patient's eyeglass RX.  We do not have an eyeglass RX on file at this time.  The patient may make an appointment with our Optometrist or follow up with Dr. Zurita at the next available appointment.

## 2022-05-04 NOTE — PROGRESS NOTES
This is a recent snapshot of the patient's [facility] medical record.  For current drug dose and complete information and questions, call [phone] or In Basket pool, fv home infusion ([id number])  CSN Number:  [id number]

## 2022-05-05 ENCOUNTER — TELEPHONE (OUTPATIENT)
Dept: OPHTHALMOLOGY | Facility: CLINIC | Age: 47
End: 2022-05-05
Payer: MEDICARE

## 2022-05-05 NOTE — TELEPHONE ENCOUNTER
Per pt request eyeglass rx to be mailed to the address on file.     I dont have a current rx on file - tried to call Rusty to gather more information     Jayna Downey Communication Facilitator on 5/5/2022 at 12:59 PM

## 2022-05-05 NOTE — TELEPHONE ENCOUNTER
[facility] Call Center    Phone Message    May a detailed message be left on voicemail: yes     Reason for Call: Other:   Pt requesting for most recent glasses rx to be mailed to address on file: [address] [place] [address]      Action Taken: Other:  EYE    Travel Screening: Not Applicable

## 2022-06-05 ENCOUNTER — HEALTH MAINTENANCE LETTER (OUTPATIENT)
Age: 47
End: 2022-06-05

## 2022-10-15 ENCOUNTER — HEALTH MAINTENANCE LETTER (OUTPATIENT)
Age: 47
End: 2022-10-15

## 2022-11-11 ENCOUNTER — TELEPHONE (OUTPATIENT)
Dept: OPHTHALMOLOGY | Facility: CLINIC | Age: 47
End: 2022-11-11

## 2022-11-11 NOTE — TELEPHONE ENCOUNTER
Rusty Serrato's mother Lizeth called to schedule a follow up appointment for him.  Last seen in 2019.  Do they need a new referral?

## 2023-01-05 DIAGNOSIS — H53.40 VISUAL FIELD DEFECT: Primary | ICD-10-CM

## 2023-05-23 ENCOUNTER — OFFICE VISIT (OUTPATIENT)
Dept: OPHTHALMOLOGY | Facility: CLINIC | Age: 48
End: 2023-05-23
Attending: OPHTHALMOLOGY
Payer: MEDICARE

## 2023-05-23 DIAGNOSIS — H53.40 VISUAL FIELD DEFECT: Primary | ICD-10-CM

## 2023-05-23 DIAGNOSIS — H47.20 OPTIC ATROPHY: Primary | ICD-10-CM

## 2023-05-23 DIAGNOSIS — H50.52 EXOPHORIA: ICD-10-CM

## 2023-05-23 DIAGNOSIS — H51.22 INTERNUCLEAR OPHTHALMOPLEGIA OF LEFT EYE: ICD-10-CM

## 2023-05-23 PROCEDURE — 92133 CPTRZD OPH DX IMG PST SGM ON: CPT | Performed by: OPHTHALMOLOGY

## 2023-05-23 PROCEDURE — G0463 HOSPITAL OUTPT CLINIC VISIT: HCPCS | Performed by: OPHTHALMOLOGY

## 2023-05-23 PROCEDURE — 92060 SENSORIMOTOR EXAMINATION: CPT | Mod: 26 | Performed by: OPHTHALMOLOGY

## 2023-05-23 PROCEDURE — 92083 EXTENDED VISUAL FIELD XM: CPT | Performed by: OPHTHALMOLOGY

## 2023-05-23 PROCEDURE — 92014 COMPRE OPH EXAM EST PT 1/>: CPT | Performed by: OPHTHALMOLOGY

## 2023-05-23 PROCEDURE — 92015 DETERMINE REFRACTIVE STATE: CPT | Mod: GY

## 2023-05-23 PROCEDURE — 92060 SENSORIMOTOR EXAMINATION: CPT | Performed by: OPHTHALMOLOGY

## 2023-05-23 ASSESSMENT — REFRACTION_WEARINGRX
OD_SPHERE: -5.75
OD_AXIS: 047
OS_CYLINDER: +0.25
OS_AXIS: 108
OS_SPHERE: -5.75
OD_CYLINDER: +0.50
SPECS_TYPE: SVL

## 2023-05-23 ASSESSMENT — VISUAL ACUITY
OS_CC+: -2
OS_CC: 20/20
OD_CC+: -3
CORRECTION_TYPE: GLASSES
OD_CC: 20/25
METHOD: SNELLEN - LINEAR

## 2023-05-23 ASSESSMENT — EXTERNAL EXAM - LEFT EYE: OS_EXAM: NORMAL

## 2023-05-23 ASSESSMENT — REFRACTION_MANIFEST
OS_SPHERE: -6.25
OD_CYLINDER: +0.75
OS_AXIS: 110
OS_CYLINDER: +0.25
OD_ADD: +2.25
OS_ADD: +2.25
OD_AXIS: 035
OD_SPHERE: -6.00

## 2023-05-23 ASSESSMENT — TONOMETRY
OD_IOP_MMHG: 10
OS_IOP_MMHG: 10
IOP_METHOD: ICARE

## 2023-05-23 ASSESSMENT — CUP TO DISC RATIO
OS_RATIO: 0.2
OD_RATIO: 0.2

## 2023-05-23 ASSESSMENT — SLIT LAMP EXAM - LIDS
COMMENTS: NORMAL
COMMENTS: NORMAL

## 2023-05-23 ASSESSMENT — EXTERNAL EXAM - RIGHT EYE: OD_EXAM: NORMAL

## 2023-05-23 NOTE — PROGRESS NOTES
Assessment & Plan     Rusty Serrato is a 48 year old male with the following diagnoses:   1. Optic atrophy    2. Exophoria    3. 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  At the time of last visit he had stable optic atrophy in both eyes.  He he had continued left internuclear ophthalmoplegia.  His visual acuity was down at that time.  I did not think this was due to multiple sclerosis, and felt possibly due to dry eyes.     HPI:  Doing well today. Reports that visual field feels stable since last visit. Reports no double vision, scotomas or field defects. Tried artifical tears for a short period of time, although doesn't report any difference with use. No complaints of dry eye, irritation, or excessive watering today. He tried Ocrevus previously but due to multiple hospitalizations after starting that medication they decided not to continue this.      Exam:  Corrected distance visual acuity was 20/25 -3 in the right eye and 20/20 -2 in the left eye. Intraocular pressure was 10 in the right eye and 10 in the left eye using ICare.  Color vision 11/11 right eye and 11/11 left eye.  Pupils equal, no APD.  Anterior segment exam unremarkable.  Fundus exam with temporal pallor. Strabismus exam with left JAIDEN.    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 Today  his OCT RNFL thickness is stable. His visual field changes are likely due to testing fatigue. BCVA 20/20 with updated MRx. Come back for follow up in 2 years or sooner if new visual symptoms develop.          Attending Physician Attestation:  Complete documentation of historical and exam elements from today's encounter can be found in the full encounter summary report (not reduplicated in this progress note).  I personally obtained the chief complaint(s) and history of present illness.  I confirmed and edited as necessary the review of systems, past medical/surgical history, family history, social history, and examination findings as documented by others; and I examined the patient myself.  I personally reviewed the relevant tests, images, and reports as documented above.  I formulated and edited as necessary the assessment and plan and discussed the findings and management plan with the patient and family. I was present with the medical student who participated in the service and in the documentation of this note. - MD Prateek Mancini, MS-4  Medical Student, [facility]

## 2023-05-23 NOTE — NURSING NOTE
Chief Complaint(s) and History of Present Illness(es)     Annual Eye Exam    In both eyes.  Associated symptoms include Negative for double vision, eye pain and headache.  Pain was noted as 0/10. Additional comments: Rusty Serrato is a 48 year old male with the following diagnoses:   1. Multiple sclerosis (H)   2. Visual field defect   3. Optic atrophy   4. Internuclear ophthalmoplegia of left eye     Rusty reports doing well since his last exam.  No eye pain or discomfort.  He denies double vision.  Would like an updated Rx today.  ALPHONSE Orr 5/23/2023 1:21 PM

## 2023-06-11 ENCOUNTER — HEALTH MAINTENANCE LETTER (OUTPATIENT)
Age: 48
End: 2023-06-11

## 2023-11-17 ENCOUNTER — LAB REQUISITION (OUTPATIENT)
Dept: LAB | Facility: CLINIC | Age: 48
End: 2023-11-17
Payer: MEDICARE

## 2023-11-17 DIAGNOSIS — J98.4 OTHER DISORDERS OF LUNG: ICD-10-CM

## 2023-11-17 LAB
ANION GAP SERPL CALCULATED.3IONS-SCNC: 11 MMOL/L (ref 7–15)
BUN SERPL-MCNC: 13.3 MG/DL (ref 6–20)
CALCIUM SERPL-MCNC: 9.2 MG/DL (ref 8.6–10)
CHLORIDE SERPL-SCNC: 98 MMOL/L (ref 98–107)
CREAT SERPL-MCNC: 0.73 MG/DL (ref 0.67–1.17)
DEPRECATED HCO3 PLAS-SCNC: 28 MMOL/L (ref 22–29)
EGFRCR SERPLBLD CKD-EPI 2021: >90 ML/MIN/1.73M2
ERYTHROCYTE [DISTWIDTH] IN BLOOD BY AUTOMATED COUNT: 13.7 % (ref 10–15)
GLUCOSE SERPL-MCNC: 100 MG/DL (ref 70–99)
HCT VFR BLD AUTO: 40.2 % (ref 40–53)
HGB BLD-MCNC: 13.8 G/DL (ref 13.3–17.7)
MCH RBC QN AUTO: 31.7 PG (ref 26.5–33)
MCHC RBC AUTO-ENTMCNC: 34.3 G/DL (ref 31.5–36.5)
MCV RBC AUTO: 92 FL (ref 78–100)
PLATELET # BLD AUTO: 238 10E3/UL (ref 150–450)
POTASSIUM SERPL-SCNC: 3.6 MMOL/L (ref 3.4–5.3)
RBC # BLD AUTO: 4.36 10E6/UL (ref 4.4–5.9)
SODIUM SERPL-SCNC: 137 MMOL/L (ref 135–145)
WBC # BLD AUTO: 11.6 10E3/UL (ref 4–11)

## 2023-11-17 PROCEDURE — 80048 BASIC METABOLIC PNL TOTAL CA: CPT | Mod: ORL

## 2023-11-17 PROCEDURE — 85027 COMPLETE CBC AUTOMATED: CPT | Mod: ORL

## 2024-06-27 ENCOUNTER — LAB REQUISITION (OUTPATIENT)
Dept: LAB | Facility: HOSPITAL | Age: 49
End: 2024-06-27
Payer: MEDICARE

## 2024-06-27 DIAGNOSIS — R13.10 DYSPHAGIA, UNSPECIFIED: ICD-10-CM

## 2024-06-27 LAB — VANCOMYCIN SERPL-MCNC: 13.5 UG/ML

## 2024-06-27 PROCEDURE — 80202 ASSAY OF VANCOMYCIN: CPT | Performed by: PHYSICIAN ASSISTANT

## 2024-06-27 PROCEDURE — 82565 ASSAY OF CREATININE: CPT | Performed by: PHYSICIAN ASSISTANT

## 2024-06-28 DIAGNOSIS — A41.9 UNSPECIFIED SEPTICEMIA(038.9) (H): Primary | ICD-10-CM

## 2024-06-28 LAB
CREAT SERPL-MCNC: 0.73 MG/DL (ref 0.67–1.17)
EGFRCR SERPLBLD CKD-EPI 2021: >90 ML/MIN/1.73M2

## 2024-07-01 ENCOUNTER — LAB REQUISITION (OUTPATIENT)
Dept: LAB | Facility: HOSPITAL | Age: 49
End: 2024-07-01
Payer: MEDICARE

## 2024-07-01 DIAGNOSIS — R13.0 APHAGIA: ICD-10-CM

## 2024-07-01 DIAGNOSIS — A41.9 SEPSIS, UNSPECIFIED ORGANISM (H): ICD-10-CM

## 2024-07-01 DIAGNOSIS — J98.4 OTHER DISORDERS OF LUNG: ICD-10-CM

## 2024-07-01 LAB
AST SERPL W P-5'-P-CCNC: 10 U/L (ref 0–45)
BASOPHILS # BLD AUTO: 0.1 10E3/UL (ref 0–0.2)
BASOPHILS NFR BLD AUTO: 1 %
BUN SERPL-MCNC: 18.8 MG/DL (ref 6–20)
CREAT SERPL-MCNC: 0.66 MG/DL (ref 0.67–1.17)
CRP SERPL-MCNC: <3 MG/L
EGFRCR SERPLBLD CKD-EPI 2021: >90 ML/MIN/1.73M2
EOSINOPHIL # BLD AUTO: 0.1 10E3/UL (ref 0–0.7)
EOSINOPHIL NFR BLD AUTO: 1 %
ERYTHROCYTE [DISTWIDTH] IN BLOOD BY AUTOMATED COUNT: 13.6 % (ref 10–15)
HCT VFR BLD AUTO: 38.7 % (ref 40–53)
HGB BLD-MCNC: 12.9 G/DL (ref 13.3–17.7)
IMM GRANULOCYTES # BLD: 0 10E3/UL
IMM GRANULOCYTES NFR BLD: 0 %
LYMPHOCYTES # BLD AUTO: 2.1 10E3/UL (ref 0.8–5.3)
LYMPHOCYTES NFR BLD AUTO: 20 %
MCH RBC QN AUTO: 29.6 PG (ref 26.5–33)
MCHC RBC AUTO-ENTMCNC: 33.3 G/DL (ref 31.5–36.5)
MCV RBC AUTO: 89 FL (ref 78–100)
MONOCYTES # BLD AUTO: 0.8 10E3/UL (ref 0–1.3)
MONOCYTES NFR BLD AUTO: 8 %
NEUTROPHILS # BLD AUTO: 7.4 10E3/UL (ref 1.6–8.3)
NEUTROPHILS NFR BLD AUTO: 71 %
NRBC # BLD AUTO: 0 10E3/UL
NRBC BLD AUTO-RTO: 0 /100
PLATELET # BLD AUTO: 269 10E3/UL (ref 150–450)
RBC # BLD AUTO: 4.36 10E6/UL (ref 4.4–5.9)
VANCOMYCIN SERPL-MCNC: 12.2 UG/ML
WBC # BLD AUTO: 10.4 10E3/UL (ref 4–11)

## 2024-07-01 PROCEDURE — 85025 COMPLETE CBC W/AUTO DIFF WBC: CPT | Performed by: FAMILY MEDICINE

## 2024-07-01 PROCEDURE — 86140 C-REACTIVE PROTEIN: CPT | Performed by: FAMILY MEDICINE

## 2024-07-01 PROCEDURE — 84520 ASSAY OF UREA NITROGEN: CPT | Performed by: FAMILY MEDICINE

## 2024-07-01 PROCEDURE — 80202 ASSAY OF VANCOMYCIN: CPT | Performed by: FAMILY MEDICINE

## 2024-07-01 PROCEDURE — 82565 ASSAY OF CREATININE: CPT | Performed by: FAMILY MEDICINE

## 2024-07-01 PROCEDURE — 84450 TRANSFERASE (AST) (SGOT): CPT | Performed by: FAMILY MEDICINE

## 2024-08-04 ENCOUNTER — HEALTH MAINTENANCE LETTER (OUTPATIENT)
Age: 49
End: 2024-08-04

## 2024-08-06 ENCOUNTER — LAB REQUISITION (OUTPATIENT)
Dept: LAB | Facility: HOSPITAL | Age: 49
End: 2024-08-06
Payer: MEDICARE

## 2024-08-06 DIAGNOSIS — J98.4 OTHER DISORDERS OF LUNG: ICD-10-CM

## 2024-08-06 DIAGNOSIS — R05.1 ACUTE COUGH: ICD-10-CM

## 2024-08-06 LAB
ANION GAP SERPL CALCULATED.3IONS-SCNC: 13 MMOL/L (ref 7–15)
BASOPHILS # BLD AUTO: 0.1 10E3/UL (ref 0–0.2)
BASOPHILS NFR BLD AUTO: 1 %
BUN SERPL-MCNC: 19.4 MG/DL (ref 6–20)
CALCIUM SERPL-MCNC: 9.1 MG/DL (ref 8.8–10.4)
CHLORIDE SERPL-SCNC: 105 MMOL/L (ref 98–107)
CREAT SERPL-MCNC: 0.69 MG/DL (ref 0.67–1.17)
EGFRCR SERPLBLD CKD-EPI 2021: >90 ML/MIN/1.73M2
EOSINOPHIL # BLD AUTO: 0.1 10E3/UL (ref 0–0.7)
EOSINOPHIL NFR BLD AUTO: 1 %
ERYTHROCYTE [DISTWIDTH] IN BLOOD BY AUTOMATED COUNT: 14.6 % (ref 10–15)
GLUCOSE SERPL-MCNC: 99 MG/DL (ref 70–99)
HCO3 SERPL-SCNC: 22 MMOL/L (ref 22–29)
HCT VFR BLD AUTO: 41.2 % (ref 40–53)
HGB BLD-MCNC: 13.7 G/DL (ref 13.3–17.7)
IMM GRANULOCYTES # BLD: 0 10E3/UL
IMM GRANULOCYTES NFR BLD: 0 %
LYMPHOCYTES # BLD AUTO: 1.2 10E3/UL (ref 0.8–5.3)
LYMPHOCYTES NFR BLD AUTO: 11 %
MCH RBC QN AUTO: 29.4 PG (ref 26.5–33)
MCHC RBC AUTO-ENTMCNC: 33.3 G/DL (ref 31.5–36.5)
MCV RBC AUTO: 88 FL (ref 78–100)
MONOCYTES # BLD AUTO: 0.6 10E3/UL (ref 0–1.3)
MONOCYTES NFR BLD AUTO: 5 %
NEUTROPHILS # BLD AUTO: 8.6 10E3/UL (ref 1.6–8.3)
NEUTROPHILS NFR BLD AUTO: 82 %
NRBC # BLD AUTO: 0 10E3/UL
NRBC BLD AUTO-RTO: 0 /100
PLATELET # BLD AUTO: 246 10E3/UL (ref 150–450)
POTASSIUM SERPL-SCNC: 3.9 MMOL/L (ref 3.4–5.3)
RBC # BLD AUTO: 4.66 10E6/UL (ref 4.4–5.9)
SODIUM SERPL-SCNC: 140 MMOL/L (ref 135–145)
WBC # BLD AUTO: 10.5 10E3/UL (ref 4–11)

## 2024-08-06 PROCEDURE — 85025 COMPLETE CBC W/AUTO DIFF WBC: CPT | Performed by: FAMILY MEDICINE

## 2024-08-06 PROCEDURE — 80048 BASIC METABOLIC PNL TOTAL CA: CPT | Performed by: FAMILY MEDICINE

## 2024-09-05 ENCOUNTER — ENROLLMENT (OUTPATIENT)
Dept: HOME HEALTH SERVICES | Facility: HOME HEALTH | Age: 49
End: 2024-09-05
Payer: MEDICARE

## 2024-09-26 NOTE — PROGRESS NOTES
06/21/2024: NO IV ABX COVERAGE. PT WILL BE SELF-PAY. SV                         IF CADD DISPENDED, RETURN TO INTAKE FOR ABN  06/25/2024: PT IS AGREEABLE TO IV ABX SPQ. PER COLTEN AWGGONER. AJ    02/22/2021: PT MEETS MEDICARE CRITERIA FOR ENTERAL. ANTICIPATED LENGTH OF THERAPY MUST BE 90 DAYS OR GREATER AT THE TIME OF SERVICE. 1 MONTH PUMP LETTER MUST BE ATTACHED. AD  09/11/2024: ENTERAL MUST BE VIA TUBE FOR SECONDARY TO COVERAGE AS WELL.  PT HAS COVERAGE FOR LINE CARE AND HYDRATION THROUGH SECONDARY.  NO TPA COVERAGE. AJ    05/08/2024: [facility] CANNOT DO NURSING IF PT IS HB, IF NOT CAN BE IVN IF PT AGREES TO SELF-PAY.  NO COVERAGE FOR TPA, LINE CARE OR HYDRATION. AJ    02/21/2024 - TT from Colten WAGGONER; Pt he may go home on TPN. Did anyone call and ask for benefit coverage for TPN for him? Please check coverage. TseY PT WILL NEED 90 DAY OR GREATER TARA DOCUMENTATION BY MD IN ORDER TO MEET MEDICARE CRITERIA. HB VS NON-HB STATUS APPLIES [place]    PER MAREN, PT MEETS MEDICARE CRITERIA FOR ENTERAL. ANTICIPATED LENGTH OF THERAPY MUST BE 90 DAYS OR GREATER AT THE TIME OF SERVICE. 1 MONTH PUMP LETTER MUST BE ATTACHED. ([facility] CANNOT DO NURSING IF PT IS HB)  OTHER THERAPY: RETURN TO INTAKE FOR COVERAGE DETERMINATION

## 2024-10-13 ENCOUNTER — HOME INFUSION (OUTPATIENT)
Dept: HOME HEALTH SERVICES | Facility: HOME HEALTH | Age: 49
End: 2024-10-13
Payer: MEDICARE

## 2024-10-21 DIAGNOSIS — H53.40 VISUAL FIELD DEFECT: Primary | ICD-10-CM

## 2024-10-24 ENCOUNTER — OFFICE VISIT (OUTPATIENT)
Dept: OPHTHALMOLOGY | Facility: CLINIC | Age: 49
End: 2024-10-24
Attending: OPHTHALMOLOGY
Payer: MEDICARE

## 2024-10-24 DIAGNOSIS — H47.20 OPTIC ATROPHY: Primary | ICD-10-CM

## 2024-10-24 DIAGNOSIS — H50.52 EXOPHORIA: ICD-10-CM

## 2024-10-24 DIAGNOSIS — H51.22 INTERNUCLEAR OPHTHALMOPLEGIA OF LEFT EYE: ICD-10-CM

## 2024-10-24 PROCEDURE — G0463 HOSPITAL OUTPT CLINIC VISIT: HCPCS | Performed by: OPHTHALMOLOGY

## 2024-10-24 PROCEDURE — 92014 COMPRE OPH EXAM EST PT 1/>: CPT | Mod: GC | Performed by: OPHTHALMOLOGY

## 2024-10-24 PROCEDURE — 92083 EXTENDED VISUAL FIELD XM: CPT | Performed by: OPHTHALMOLOGY

## 2024-10-24 PROCEDURE — 92133 CPTRZD OPH DX IMG PST SGM ON: CPT | Performed by: OPHTHALMOLOGY

## 2024-10-24 ASSESSMENT — TONOMETRY
IOP_METHOD: ICARE
OS_IOP_MMHG: 12
OD_IOP_MMHG: 13

## 2024-10-24 ASSESSMENT — REFRACTION_WEARINGRX
OS_AXIS: 110
OD_SPHERE: -6.25
OS_SPHERE: -6.50
OS_CYLINDER: +0.25
OD_AXIS: 030
OD_CYLINDER: +0.75

## 2024-10-24 ASSESSMENT — VISUAL ACUITY
OD_CC: 20/25
METHOD: SNELLEN - LINEAR
CORRECTION_TYPE: GLASSES
OS_CC: 20/25
OD_CC+: -3
OS_CC+: -1

## 2024-10-24 ASSESSMENT — SLIT LAMP EXAM - LIDS
COMMENTS: NORMAL
COMMENTS: NORMAL

## 2024-10-24 ASSESSMENT — CONF VISUAL FIELD
OS_SUPERIOR_TEMPORAL_RESTRICTION: 0
OD_NORMAL: 1
OS_NORMAL: 1
OS_SUPERIOR_NASAL_RESTRICTION: 0
OD_INFERIOR_TEMPORAL_RESTRICTION: 0
OD_INFERIOR_NASAL_RESTRICTION: 0
OS_INFERIOR_NASAL_RESTRICTION: 0
OD_SUPERIOR_TEMPORAL_RESTRICTION: 0
OD_SUPERIOR_NASAL_RESTRICTION: 0
METHOD: COUNTING FINGERS
OS_INFERIOR_TEMPORAL_RESTRICTION: 0

## 2024-10-24 NOTE — PROGRESS NOTES
Rusty Serrato is a 49 year old male with the following diagnoses:   1. Optic atrophy    2. Exophoria    3. 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  At the time of last visit he had  optic atrophy in both eyes and left internuclear ophthalmoplegia.      HPI:  Doing well today with his eyes. Reports that visual field feels worse than last visit. Reports issues with reading only, glasses have been updated last fall but his spouse mentions that may not be good for reading. Used AT for a short period of time, didn't feel any difference. No complaints of dry eye, pian or other visual complaints today. He tried Ocrevus previously but due to multiple hospitalizations after starting that medication they decided not to continue this.  He has not received any infusions since 2017.    He has had bowel obstruction this 1/19/2024, now has a colostomy. He has recurrent pneumonia and lots of hospitalizations. 06/2024 had sepsis due to catheter infection. He was diagnosed with gastroparesis during recent hospitalization.      Tests ordered and interpreted today:  Sensorimotor: limited adduction left eye      GTOP visual fields: reliable   right eye:  enlargement of central scotoma   left eye: unspecific defects, more paracentrally      OCT RNFL:  right eye: average thickness 64 from 66, thinning superiorly and temporally no change since 5/2023  left eye: average thickness 63, diffuse thinning, no change since 5/2023    Exam:  Corrected distance visual acuity was 20/25 -3 in the right eye and 20/25 -1 in the left eye. Intraocular pressure was 13 in the right eye and 12 in the left eye using ICare.  Color vision 11/11 right eye and 11/11 left eye.  Pupils equal, no APD.  Anterior segment exam unremarkable.  Fundus exam with temporal pallor. Strabismus exam with left JAIDEN with abducting nystagmus to the right side. He  also has right sided nystagmus in primary gaze.       Glaucoma Top OU          Performed by: Vidhya   . Patient cooperation: Reliable   .   Had a very difficult time positioning pt. Good fix. Dilated after vf.     Right Eye  Reliability of the test: Good   . Findings: Depressed mean deviation, Central scotoma   . Interpretation: Abnormal   . Interval: Worse   .     Left Eye  Reliability of the test: Good   . Findings: Depressed mean deviation, Nonspecific defect, Paracentral defects   . Interpretation: Abnormal   . Interval: Same   .          OCT Optic Nerve RNFL Spectralis OU (both eyes)          Performed by: long   . Patient cooperation: Reliable   .     Right Eye  Reliability of the test: Fair   . Test Findings: Abnormal   . Test Findings Free Text: 64   . Interpretation: Superior loss, Temporal loss   . Plan: Monitor   . Interval: Same   .     Left Eye  Reliability of the test: Fair   . Test Findings: Abnormal   . Test Findings Free Text: 63   . Interpretation: Diffuse loss   . Plan: Monitor   . Interval: Same   .            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     Today his OCT RNFL thickness is stable. His visual field changes are slightly worse in the right eye and better in the left eye.  Overall, they are grossly stable.  His main complaint is difficulty reading, and this is most likely due to presbyopia.  I issued him a new prescription with bifocals.            Attending Physician Attestation:  Complete documentation of historical and exam elements from today's encounter can be found in the full encounter summary report (not reduplicated in this progress note).  I personally obtained the chief complaint(s) and history of present illness.  I confirmed and edited as necessary the review of systems, past medical/surgical history, family history, social history, and examination findings as documented by others; and I examined the patient myself.  I  personally reviewed the relevant tests, images, and reports as documented above.  I formulated and edited as necessary the assessment and plan and discussed the findings and management plan with the patient and family. I personally reviewed the ophthalmic test(s) associated with this encounter, agree with the interpretation(s) as documented by the resident/fellow, and have edited the corresponding report(s) as necessary.  - Bear Stokes MD   Fellow, Neuro-Ophthalmology

## 2024-11-06 ENCOUNTER — ENROLLMENT (OUTPATIENT)
Dept: HOME HEALTH SERVICES | Facility: HOME HEALTH | Age: 49
End: 2024-11-06
Payer: MEDICARE

## 2024-11-15 ENCOUNTER — HOME INFUSION BILLING (OUTPATIENT)
Dept: HOME HEALTH SERVICES | Facility: HOME HEALTH | Age: 49
End: 2024-11-15
Payer: MEDICARE

## 2024-11-22 ENCOUNTER — HOME INFUSION BILLING (OUTPATIENT)
Dept: HOME HEALTH SERVICES | Facility: HOME HEALTH | Age: 49
End: 2024-11-22
Payer: MEDICARE

## 2024-12-20 ENCOUNTER — HOME INFUSION BILLING (OUTPATIENT)
Dept: HOME HEALTH SERVICES | Facility: HOME HEALTH | Age: 49
End: 2024-12-20
Payer: MEDICARE

## 2024-12-20 PROCEDURE — B4153 EF HYDROLYZED/AMINO ACIDS: HCPCS

## 2024-12-20 PROCEDURE — B9002 ENTER NUTR INF PUMP ANY TYPE: HCPCS | Mod: RR

## 2024-12-20 PROCEDURE — B4035 ENTERAL FEED SUPP PUMP PER D: HCPCS

## 2024-12-21 PROCEDURE — B4035 ENTERAL FEED SUPP PUMP PER D: HCPCS

## 2024-12-22 PROCEDURE — B4035 ENTERAL FEED SUPP PUMP PER D: HCPCS

## 2024-12-23 PROCEDURE — B4035 ENTERAL FEED SUPP PUMP PER D: HCPCS

## 2024-12-24 PROCEDURE — B4035 ENTERAL FEED SUPP PUMP PER D: HCPCS

## 2024-12-25 PROCEDURE — B4035 ENTERAL FEED SUPP PUMP PER D: HCPCS

## 2024-12-26 PROCEDURE — B4035 ENTERAL FEED SUPP PUMP PER D: HCPCS

## 2024-12-27 PROCEDURE — B4035 ENTERAL FEED SUPP PUMP PER D: HCPCS

## 2024-12-28 PROCEDURE — B4035 ENTERAL FEED SUPP PUMP PER D: HCPCS

## 2024-12-29 PROCEDURE — B4035 ENTERAL FEED SUPP PUMP PER D: HCPCS

## 2024-12-30 PROCEDURE — B4035 ENTERAL FEED SUPP PUMP PER D: HCPCS

## 2024-12-31 PROCEDURE — B4035 ENTERAL FEED SUPP PUMP PER D: HCPCS

## 2025-01-01 PROCEDURE — B4035 ENTERAL FEED SUPP PUMP PER D: HCPCS

## 2025-01-02 PROCEDURE — B4035 ENTERAL FEED SUPP PUMP PER D: HCPCS

## 2025-01-03 PROCEDURE — B4035 ENTERAL FEED SUPP PUMP PER D: HCPCS

## 2025-01-04 PROCEDURE — B4035 ENTERAL FEED SUPP PUMP PER D: HCPCS

## 2025-01-05 PROCEDURE — B4035 ENTERAL FEED SUPP PUMP PER D: HCPCS

## 2025-01-06 PROCEDURE — B4035 ENTERAL FEED SUPP PUMP PER D: HCPCS

## 2025-01-07 PROCEDURE — B4035 ENTERAL FEED SUPP PUMP PER D: HCPCS

## 2025-01-08 PROCEDURE — B4035 ENTERAL FEED SUPP PUMP PER D: HCPCS

## 2025-01-09 PROCEDURE — B4035 ENTERAL FEED SUPP PUMP PER D: HCPCS

## 2025-01-10 PROCEDURE — B4035 ENTERAL FEED SUPP PUMP PER D: HCPCS

## 2025-01-11 PROCEDURE — B4035 ENTERAL FEED SUPP PUMP PER D: HCPCS

## 2025-01-12 PROCEDURE — B4035 ENTERAL FEED SUPP PUMP PER D: HCPCS

## 2025-01-13 PROCEDURE — B4035 ENTERAL FEED SUPP PUMP PER D: HCPCS

## 2025-01-14 PROCEDURE — B4035 ENTERAL FEED SUPP PUMP PER D: HCPCS

## 2025-01-15 ENCOUNTER — HOME INFUSION (OUTPATIENT)
Dept: HOME HEALTH SERVICES | Facility: HOME HEALTH | Age: 50
End: 2025-01-15
Payer: MEDICARE

## 2025-01-15 PROCEDURE — B4035 ENTERAL FEED SUPP PUMP PER D: HCPCS

## 2025-01-16 ENCOUNTER — HOME INFUSION BILLING (OUTPATIENT)
Dept: HOME HEALTH SERVICES | Facility: HOME HEALTH | Age: 50
End: 2025-01-16
Payer: MEDICARE

## 2025-01-16 PROCEDURE — B4035 ENTERAL FEED SUPP PUMP PER D: HCPCS

## 2025-01-17 PROCEDURE — B4035 ENTERAL FEED SUPP PUMP PER D: HCPCS

## 2025-01-18 PROCEDURE — B4035 ENTERAL FEED SUPP PUMP PER D: HCPCS

## 2025-02-12 ENCOUNTER — HOME INFUSION (OUTPATIENT)
Dept: HOME HEALTH SERVICES | Facility: HOME HEALTH | Age: 50
End: 2025-02-12
Payer: MEDICARE

## 2025-02-12 ENCOUNTER — HOME INFUSION BILLING (OUTPATIENT)
Dept: HOME HEALTH SERVICES | Facility: HOME HEALTH | Age: 50
End: 2025-02-12
Payer: MEDICARE

## 2025-02-12 PROCEDURE — B9002 ENTER NUTR INF PUMP ANY TYPE: HCPCS | Mod: RR

## 2025-02-13 PROCEDURE — B4035 ENTERAL FEED SUPP PUMP PER D: HCPCS

## 2025-02-13 PROCEDURE — B4153 EF HYDROLYZED/AMINO ACIDS: HCPCS

## 2025-02-14 PROCEDURE — B4035 ENTERAL FEED SUPP PUMP PER D: HCPCS

## 2025-02-15 PROCEDURE — B4035 ENTERAL FEED SUPP PUMP PER D: HCPCS

## 2025-02-16 PROCEDURE — B4035 ENTERAL FEED SUPP PUMP PER D: HCPCS

## 2025-02-17 PROCEDURE — B4035 ENTERAL FEED SUPP PUMP PER D: HCPCS

## 2025-02-18 PROCEDURE — B4035 ENTERAL FEED SUPP PUMP PER D: HCPCS

## 2025-02-19 PROCEDURE — B4035 ENTERAL FEED SUPP PUMP PER D: HCPCS

## 2025-02-20 PROCEDURE — B4035 ENTERAL FEED SUPP PUMP PER D: HCPCS

## 2025-02-21 PROCEDURE — B4035 ENTERAL FEED SUPP PUMP PER D: HCPCS

## 2025-02-22 PROCEDURE — B4035 ENTERAL FEED SUPP PUMP PER D: HCPCS

## 2025-02-23 PROCEDURE — B4035 ENTERAL FEED SUPP PUMP PER D: HCPCS

## 2025-02-24 PROCEDURE — B4035 ENTERAL FEED SUPP PUMP PER D: HCPCS

## 2025-02-25 PROCEDURE — B4035 ENTERAL FEED SUPP PUMP PER D: HCPCS

## 2025-02-26 PROCEDURE — B4035 ENTERAL FEED SUPP PUMP PER D: HCPCS

## 2025-02-27 PROCEDURE — B4035 ENTERAL FEED SUPP PUMP PER D: HCPCS

## 2025-02-28 PROCEDURE — B4035 ENTERAL FEED SUPP PUMP PER D: HCPCS

## 2025-03-01 ENCOUNTER — EPISODE UPDATE (OUTPATIENT)
Dept: HOME HEALTH SERVICES | Facility: HOME HEALTH | Age: 50
End: 2025-03-01
Payer: MEDICARE

## 2025-03-01 PROCEDURE — B4035 ENTERAL FEED SUPP PUMP PER D: HCPCS

## 2025-03-02 PROCEDURE — B4035 ENTERAL FEED SUPP PUMP PER D: HCPCS

## 2025-03-03 PROCEDURE — B4035 ENTERAL FEED SUPP PUMP PER D: HCPCS

## 2025-03-04 PROCEDURE — B4035 ENTERAL FEED SUPP PUMP PER D: HCPCS

## 2025-03-05 PROCEDURE — B4035 ENTERAL FEED SUPP PUMP PER D: HCPCS

## 2025-03-06 PROCEDURE — B4035 ENTERAL FEED SUPP PUMP PER D: HCPCS

## 2025-03-07 PROCEDURE — B4035 ENTERAL FEED SUPP PUMP PER D: HCPCS

## 2025-03-08 PROCEDURE — B4035 ENTERAL FEED SUPP PUMP PER D: HCPCS

## 2025-03-09 PROCEDURE — B4035 ENTERAL FEED SUPP PUMP PER D: HCPCS

## 2025-03-10 PROCEDURE — B4035 ENTERAL FEED SUPP PUMP PER D: HCPCS

## 2025-03-11 ENCOUNTER — HOME INFUSION (OUTPATIENT)
Dept: HOME HEALTH SERVICES | Facility: HOME HEALTH | Age: 50
End: 2025-03-11
Payer: MEDICARE

## 2025-03-11 DIAGNOSIS — G35 MULTIPLE SCLEROSIS (H): Primary | ICD-10-CM

## 2025-03-11 PROCEDURE — B4035 ENTERAL FEED SUPP PUMP PER D: HCPCS

## 2025-03-12 PROCEDURE — B4035 ENTERAL FEED SUPP PUMP PER D: HCPCS

## 2025-03-12 PROCEDURE — B9002 ENTER NUTR INF PUMP ANY TYPE: HCPCS | Mod: RR

## 2025-03-13 PROCEDURE — B4035 ENTERAL FEED SUPP PUMP PER D: HCPCS

## 2025-03-14 PROCEDURE — B4035 ENTERAL FEED SUPP PUMP PER D: HCPCS

## 2025-03-17 NOTE — PROGRESS NOTES
[facility] ([facility]) Nurse Liaison Note:     Pt is expected to discharge later today and resume home enteral regimen.  Spoke with spouse Mary and she confirms they do not have any immediate supply/formula needs. Mary will do a inventory at home later this week and update our enteral team on Mell[facility] needs and delivery schedule to resume.  [facility] will contact [facility] to resume thier home care and make sure they get the proper discharge orders.      [facility]  Clinical Liaison  Cell [phone]  [facility] 24 hr: [phone]

## 2025-04-02 ENCOUNTER — HOME INFUSION BILLING (OUTPATIENT)
Dept: HOME HEALTH SERVICES | Facility: HOME HEALTH | Age: 50
End: 2025-04-02
Payer: MEDICARE

## 2025-04-02 PROCEDURE — B4153 EF HYDROLYZED/AMINO ACIDS: HCPCS

## 2025-04-02 PROCEDURE — B4035 ENTERAL FEED SUPP PUMP PER D: HCPCS

## 2025-04-03 ENCOUNTER — HOME INFUSION BILLING (OUTPATIENT)
Dept: HOME HEALTH SERVICES | Facility: HOME HEALTH | Age: 50
End: 2025-04-03
Payer: MEDICARE

## 2025-04-03 PROCEDURE — B4035 ENTERAL FEED SUPP PUMP PER D: HCPCS

## 2025-04-04 PROCEDURE — B4035 ENTERAL FEED SUPP PUMP PER D: HCPCS

## 2025-04-05 PROCEDURE — B4035 ENTERAL FEED SUPP PUMP PER D: HCPCS

## 2025-04-06 PROCEDURE — B4035 ENTERAL FEED SUPP PUMP PER D: HCPCS

## 2025-04-07 PROCEDURE — B4035 ENTERAL FEED SUPP PUMP PER D: HCPCS

## 2025-04-08 PROCEDURE — B4035 ENTERAL FEED SUPP PUMP PER D: HCPCS

## 2025-04-09 PROCEDURE — B4035 ENTERAL FEED SUPP PUMP PER D: HCPCS

## 2025-04-10 PROCEDURE — B4035 ENTERAL FEED SUPP PUMP PER D: HCPCS

## 2025-04-11 PROCEDURE — B4035 ENTERAL FEED SUPP PUMP PER D: HCPCS

## 2025-04-12 PROCEDURE — B4035 ENTERAL FEED SUPP PUMP PER D: HCPCS

## 2025-04-12 PROCEDURE — B9002 ENTER NUTR INF PUMP ANY TYPE: HCPCS | Mod: RR

## 2025-04-13 PROCEDURE — B4035 ENTERAL FEED SUPP PUMP PER D: HCPCS

## 2025-04-14 PROCEDURE — B4035 ENTERAL FEED SUPP PUMP PER D: HCPCS

## 2025-04-15 PROCEDURE — B4035 ENTERAL FEED SUPP PUMP PER D: HCPCS

## 2025-04-16 PROCEDURE — B4035 ENTERAL FEED SUPP PUMP PER D: HCPCS

## 2025-04-17 PROCEDURE — B4035 ENTERAL FEED SUPP PUMP PER D: HCPCS

## 2025-04-18 PROCEDURE — B4035 ENTERAL FEED SUPP PUMP PER D: HCPCS

## 2025-04-19 PROCEDURE — B4035 ENTERAL FEED SUPP PUMP PER D: HCPCS

## 2025-04-20 PROCEDURE — B4035 ENTERAL FEED SUPP PUMP PER D: HCPCS

## 2025-04-21 PROCEDURE — B4035 ENTERAL FEED SUPP PUMP PER D: HCPCS

## 2025-04-22 PROCEDURE — B4035 ENTERAL FEED SUPP PUMP PER D: HCPCS

## 2025-04-23 PROCEDURE — B4035 ENTERAL FEED SUPP PUMP PER D: HCPCS

## 2025-04-24 PROCEDURE — B4035 ENTERAL FEED SUPP PUMP PER D: HCPCS

## 2025-04-25 PROCEDURE — B4035 ENTERAL FEED SUPP PUMP PER D: HCPCS

## 2025-04-26 PROCEDURE — B4035 ENTERAL FEED SUPP PUMP PER D: HCPCS

## 2025-04-27 PROCEDURE — B4035 ENTERAL FEED SUPP PUMP PER D: HCPCS

## 2025-04-28 PROCEDURE — B4035 ENTERAL FEED SUPP PUMP PER D: HCPCS

## 2025-04-29 PROCEDURE — B4035 ENTERAL FEED SUPP PUMP PER D: HCPCS

## 2025-04-30 PROCEDURE — B4035 ENTERAL FEED SUPP PUMP PER D: HCPCS

## 2025-05-01 PROCEDURE — B4035 ENTERAL FEED SUPP PUMP PER D: HCPCS

## 2025-05-02 ENCOUNTER — HOME INFUSION BILLING (OUTPATIENT)
Dept: HOME HEALTH SERVICES | Facility: HOME HEALTH | Age: 50
End: 2025-05-02
Payer: MEDICARE

## 2025-05-02 PROCEDURE — B4035 ENTERAL FEED SUPP PUMP PER D: HCPCS

## 2025-05-02 PROCEDURE — B4153 EF HYDROLYZED/AMINO ACIDS: HCPCS

## 2025-05-03 PROCEDURE — B4035 ENTERAL FEED SUPP PUMP PER D: HCPCS

## 2025-05-04 PROCEDURE — B4035 ENTERAL FEED SUPP PUMP PER D: HCPCS

## 2025-05-05 PROCEDURE — B4035 ENTERAL FEED SUPP PUMP PER D: HCPCS

## 2025-05-05 PROCEDURE — E0776 IV POLE: HCPCS

## 2025-05-06 PROCEDURE — B4035 ENTERAL FEED SUPP PUMP PER D: HCPCS

## 2025-05-07 PROCEDURE — B4035 ENTERAL FEED SUPP PUMP PER D: HCPCS

## 2025-05-08 PROCEDURE — B4035 ENTERAL FEED SUPP PUMP PER D: HCPCS

## 2025-05-09 PROCEDURE — B4035 ENTERAL FEED SUPP PUMP PER D: HCPCS

## 2025-05-10 PROCEDURE — B4035 ENTERAL FEED SUPP PUMP PER D: HCPCS

## 2025-05-11 PROCEDURE — B4035 ENTERAL FEED SUPP PUMP PER D: HCPCS

## 2025-05-12 PROCEDURE — B9002 ENTER NUTR INF PUMP ANY TYPE: HCPCS | Mod: RR

## 2025-05-12 PROCEDURE — B4035 ENTERAL FEED SUPP PUMP PER D: HCPCS

## 2025-05-13 PROCEDURE — B4035 ENTERAL FEED SUPP PUMP PER D: HCPCS

## 2025-05-14 PROCEDURE — B4035 ENTERAL FEED SUPP PUMP PER D: HCPCS

## 2025-05-15 PROCEDURE — B4035 ENTERAL FEED SUPP PUMP PER D: HCPCS

## 2025-05-16 PROCEDURE — B4035 ENTERAL FEED SUPP PUMP PER D: HCPCS

## 2025-05-17 PROCEDURE — B4035 ENTERAL FEED SUPP PUMP PER D: HCPCS

## 2025-05-18 PROCEDURE — B4035 ENTERAL FEED SUPP PUMP PER D: HCPCS

## 2025-05-19 PROCEDURE — B4035 ENTERAL FEED SUPP PUMP PER D: HCPCS

## 2025-05-20 PROCEDURE — B4035 ENTERAL FEED SUPP PUMP PER D: HCPCS

## 2025-05-21 PROCEDURE — B4035 ENTERAL FEED SUPP PUMP PER D: HCPCS

## 2025-05-22 PROCEDURE — B4035 ENTERAL FEED SUPP PUMP PER D: HCPCS

## 2025-05-23 PROCEDURE — B4035 ENTERAL FEED SUPP PUMP PER D: HCPCS

## 2025-05-24 PROCEDURE — B4035 ENTERAL FEED SUPP PUMP PER D: HCPCS

## 2025-05-25 PROCEDURE — B4035 ENTERAL FEED SUPP PUMP PER D: HCPCS

## 2025-05-26 PROCEDURE — B4035 ENTERAL FEED SUPP PUMP PER D: HCPCS

## 2025-05-27 PROCEDURE — B4035 ENTERAL FEED SUPP PUMP PER D: HCPCS

## 2025-05-28 ENCOUNTER — HOME INFUSION (OUTPATIENT)
Dept: HOME HEALTH SERVICES | Facility: HOME HEALTH | Age: 50
End: 2025-05-28
Payer: MEDICARE

## 2025-05-28 PROCEDURE — B4035 ENTERAL FEED SUPP PUMP PER D: HCPCS

## 2025-05-29 PROCEDURE — B4035 ENTERAL FEED SUPP PUMP PER D: HCPCS

## 2025-05-30 ENCOUNTER — HOME INFUSION BILLING (OUTPATIENT)
Dept: HOME HEALTH SERVICES | Facility: HOME HEALTH | Age: 50
End: 2025-05-30
Payer: MEDICARE

## 2025-05-30 PROCEDURE — B4035 ENTERAL FEED SUPP PUMP PER D: HCPCS

## 2025-05-30 PROCEDURE — B4153 EF HYDROLYZED/AMINO ACIDS: HCPCS

## 2025-05-31 PROCEDURE — B4035 ENTERAL FEED SUPP PUMP PER D: HCPCS

## 2025-06-01 PROCEDURE — B4035 ENTERAL FEED SUPP PUMP PER D: HCPCS

## 2025-06-02 PROCEDURE — B4035 ENTERAL FEED SUPP PUMP PER D: HCPCS

## 2025-06-03 PROCEDURE — B4035 ENTERAL FEED SUPP PUMP PER D: HCPCS

## 2025-06-04 PROCEDURE — B4035 ENTERAL FEED SUPP PUMP PER D: HCPCS

## 2025-06-05 PROCEDURE — B4035 ENTERAL FEED SUPP PUMP PER D: HCPCS

## 2025-06-06 PROCEDURE — B4035 ENTERAL FEED SUPP PUMP PER D: HCPCS

## 2025-06-07 PROCEDURE — B4035 ENTERAL FEED SUPP PUMP PER D: HCPCS

## 2025-06-08 PROCEDURE — B4035 ENTERAL FEED SUPP PUMP PER D: HCPCS

## 2025-06-09 PROCEDURE — B4035 ENTERAL FEED SUPP PUMP PER D: HCPCS

## 2025-06-10 PROCEDURE — B4035 ENTERAL FEED SUPP PUMP PER D: HCPCS

## 2025-06-11 PROCEDURE — B4035 ENTERAL FEED SUPP PUMP PER D: HCPCS

## 2025-06-12 PROCEDURE — B9002 ENTER NUTR INF PUMP ANY TYPE: HCPCS | Mod: RR

## 2025-06-12 PROCEDURE — B4035 ENTERAL FEED SUPP PUMP PER D: HCPCS

## 2025-06-13 PROCEDURE — B4035 ENTERAL FEED SUPP PUMP PER D: HCPCS

## 2025-06-14 PROCEDURE — B4035 ENTERAL FEED SUPP PUMP PER D: HCPCS

## 2025-06-15 PROCEDURE — B4035 ENTERAL FEED SUPP PUMP PER D: HCPCS

## 2025-06-16 PROCEDURE — B4035 ENTERAL FEED SUPP PUMP PER D: HCPCS

## 2025-06-17 PROCEDURE — B4035 ENTERAL FEED SUPP PUMP PER D: HCPCS

## 2025-06-18 PROCEDURE — B4035 ENTERAL FEED SUPP PUMP PER D: HCPCS

## 2025-06-19 PROCEDURE — B4035 ENTERAL FEED SUPP PUMP PER D: HCPCS

## 2025-06-20 PROCEDURE — B4035 ENTERAL FEED SUPP PUMP PER D: HCPCS

## 2025-06-21 PROCEDURE — B4035 ENTERAL FEED SUPP PUMP PER D: HCPCS

## 2025-06-22 PROCEDURE — B4035 ENTERAL FEED SUPP PUMP PER D: HCPCS

## 2025-06-23 PROCEDURE — B4035 ENTERAL FEED SUPP PUMP PER D: HCPCS

## 2025-06-24 PROCEDURE — B4035 ENTERAL FEED SUPP PUMP PER D: HCPCS

## 2025-06-25 PROCEDURE — B4035 ENTERAL FEED SUPP PUMP PER D: HCPCS

## 2025-06-26 PROCEDURE — B4035 ENTERAL FEED SUPP PUMP PER D: HCPCS

## 2025-06-27 PROCEDURE — B4035 ENTERAL FEED SUPP PUMP PER D: HCPCS

## 2025-06-28 PROCEDURE — B4035 ENTERAL FEED SUPP PUMP PER D: HCPCS

## 2025-06-30 ENCOUNTER — HOME INFUSION (OUTPATIENT)
Dept: HOME HEALTH SERVICES | Facility: HOME HEALTH | Age: 50
End: 2025-06-30
Payer: MEDICARE

## 2025-07-09 ENCOUNTER — HOME INFUSION BILLING (OUTPATIENT)
Dept: HOME HEALTH SERVICES | Facility: HOME HEALTH | Age: 50
End: 2025-07-09
Payer: MEDICARE

## 2025-07-09 PROCEDURE — B4035 ENTERAL FEED SUPP PUMP PER D: HCPCS

## 2025-07-09 PROCEDURE — B4153 EF HYDROLYZED/AMINO ACIDS: HCPCS

## 2025-07-10 PROCEDURE — B4035 ENTERAL FEED SUPP PUMP PER D: HCPCS

## 2025-07-11 PROCEDURE — B4035 ENTERAL FEED SUPP PUMP PER D: HCPCS

## 2025-07-12 PROCEDURE — B9002 ENTER NUTR INF PUMP ANY TYPE: HCPCS | Mod: RR

## 2025-07-12 PROCEDURE — B4035 ENTERAL FEED SUPP PUMP PER D: HCPCS

## 2025-07-13 PROCEDURE — B4035 ENTERAL FEED SUPP PUMP PER D: HCPCS

## 2025-07-14 PROCEDURE — B4035 ENTERAL FEED SUPP PUMP PER D: HCPCS

## 2025-07-15 PROCEDURE — B4035 ENTERAL FEED SUPP PUMP PER D: HCPCS

## 2025-07-16 PROCEDURE — B4035 ENTERAL FEED SUPP PUMP PER D: HCPCS

## 2025-07-17 PROCEDURE — B4035 ENTERAL FEED SUPP PUMP PER D: HCPCS

## 2025-07-18 PROCEDURE — B4035 ENTERAL FEED SUPP PUMP PER D: HCPCS

## 2025-07-19 PROCEDURE — B4035 ENTERAL FEED SUPP PUMP PER D: HCPCS

## 2025-07-20 PROCEDURE — B4035 ENTERAL FEED SUPP PUMP PER D: HCPCS

## 2025-07-21 PROCEDURE — B4035 ENTERAL FEED SUPP PUMP PER D: HCPCS

## 2025-07-22 PROCEDURE — B4035 ENTERAL FEED SUPP PUMP PER D: HCPCS

## 2025-07-23 PROCEDURE — B4035 ENTERAL FEED SUPP PUMP PER D: HCPCS

## 2025-07-24 PROCEDURE — B4035 ENTERAL FEED SUPP PUMP PER D: HCPCS

## 2025-07-25 PROCEDURE — B4035 ENTERAL FEED SUPP PUMP PER D: HCPCS

## 2025-07-26 PROCEDURE — B4035 ENTERAL FEED SUPP PUMP PER D: HCPCS

## 2025-07-27 PROCEDURE — B4035 ENTERAL FEED SUPP PUMP PER D: HCPCS

## 2025-07-28 PROCEDURE — B4035 ENTERAL FEED SUPP PUMP PER D: HCPCS

## 2025-07-29 PROCEDURE — B4035 ENTERAL FEED SUPP PUMP PER D: HCPCS

## 2025-07-30 PROCEDURE — B4035 ENTERAL FEED SUPP PUMP PER D: HCPCS

## 2025-07-31 PROCEDURE — B4035 ENTERAL FEED SUPP PUMP PER D: HCPCS

## 2025-08-01 PROCEDURE — B4035 ENTERAL FEED SUPP PUMP PER D: HCPCS

## 2025-08-02 PROCEDURE — B4035 ENTERAL FEED SUPP PUMP PER D: HCPCS

## 2025-08-03 PROCEDURE — B4035 ENTERAL FEED SUPP PUMP PER D: HCPCS

## 2025-08-04 PROCEDURE — B4035 ENTERAL FEED SUPP PUMP PER D: HCPCS

## 2025-08-05 PROCEDURE — B4035 ENTERAL FEED SUPP PUMP PER D: HCPCS

## 2025-08-06 ENCOUNTER — HOME INFUSION (OUTPATIENT)
Dept: HOME HEALTH SERVICES | Facility: HOME HEALTH | Age: 50
End: 2025-08-06
Payer: MEDICARE

## 2025-08-06 PROCEDURE — B4035 ENTERAL FEED SUPP PUMP PER D: HCPCS

## 2025-08-07 PROCEDURE — B4035 ENTERAL FEED SUPP PUMP PER D: HCPCS

## 2025-08-08 ENCOUNTER — HOME INFUSION BILLING (OUTPATIENT)
Dept: HOME HEALTH SERVICES | Facility: HOME HEALTH | Age: 50
End: 2025-08-08
Payer: MEDICARE

## 2025-08-16 ENCOUNTER — HEALTH MAINTENANCE LETTER (OUTPATIENT)
Age: 50
End: 2025-08-16